# Patient Record
Sex: MALE | Race: WHITE | NOT HISPANIC OR LATINO | Employment: FULL TIME | URBAN - METROPOLITAN AREA
[De-identification: names, ages, dates, MRNs, and addresses within clinical notes are randomized per-mention and may not be internally consistent; named-entity substitution may affect disease eponyms.]

---

## 2024-01-25 ENCOUNTER — APPOINTMENT (OUTPATIENT)
Dept: CARDIOLOGY | Facility: HOSPITAL | Age: 59
End: 2024-01-25
Payer: COMMERCIAL

## 2024-01-25 ENCOUNTER — PHARMACY VISIT (OUTPATIENT)
Dept: PHARMACY | Facility: CLINIC | Age: 59
End: 2024-01-25
Payer: COMMERCIAL

## 2024-01-25 ENCOUNTER — HOSPITAL ENCOUNTER (OUTPATIENT)
Facility: HOSPITAL | Age: 59
Setting detail: OBSERVATION
Discharge: HOME | End: 2024-01-25
Attending: STUDENT IN AN ORGANIZED HEALTH CARE EDUCATION/TRAINING PROGRAM | Admitting: INTERNAL MEDICINE
Payer: COMMERCIAL

## 2024-01-25 ENCOUNTER — APPOINTMENT (OUTPATIENT)
Dept: RADIOLOGY | Facility: HOSPITAL | Age: 59
End: 2024-01-25
Payer: COMMERCIAL

## 2024-01-25 VITALS
BODY MASS INDEX: 23.03 KG/M2 | HEIGHT: 72 IN | RESPIRATION RATE: 24 BRPM | HEART RATE: 76 BPM | SYSTOLIC BLOOD PRESSURE: 143 MMHG | OXYGEN SATURATION: 99 % | DIASTOLIC BLOOD PRESSURE: 85 MMHG | WEIGHT: 170 LBS | TEMPERATURE: 98.2 F

## 2024-01-25 DIAGNOSIS — G45.9 TRANSIENT CEREBRAL ISCHEMIC ATTACK, UNSPECIFIED: ICD-10-CM

## 2024-01-25 DIAGNOSIS — R20.0 RIGHT FACIAL NUMBNESS: ICD-10-CM

## 2024-01-25 DIAGNOSIS — E78.00 HIGH CHOLESTEROL: ICD-10-CM

## 2024-01-25 DIAGNOSIS — G51.0 BELL PALSY: Primary | ICD-10-CM

## 2024-01-25 DIAGNOSIS — I61.9 CVA (CEREBROVASCULAR ACCIDENT DUE TO INTRACEREBRAL HEMORRHAGE) (MULTI): ICD-10-CM

## 2024-01-25 PROBLEM — H66.90 EAR INFECTION: Status: ACTIVE | Noted: 2024-01-25

## 2024-01-25 PROBLEM — H66.90 EAR INFECTION: Status: RESOLVED | Noted: 2024-01-25 | Resolved: 2024-01-25

## 2024-01-25 LAB
ALBUMIN SERPL-MCNC: 4.8 G/DL (ref 3.5–5)
ALP BLD-CCNC: 75 U/L (ref 35–125)
ALT SERPL-CCNC: 12 U/L (ref 5–40)
ANION GAP SERPL CALC-SCNC: 11 MMOL/L
AST SERPL-CCNC: 17 U/L (ref 5–40)
BASOPHILS # BLD AUTO: 0.03 X10*3/UL (ref 0–0.1)
BASOPHILS NFR BLD AUTO: 0.5 %
BILIRUB SERPL-MCNC: 1.6 MG/DL (ref 0.1–1.2)
BUN SERPL-MCNC: 13 MG/DL (ref 8–25)
CALCIUM SERPL-MCNC: 9.8 MG/DL (ref 8.5–10.4)
CHLORIDE SERPL-SCNC: 97 MMOL/L (ref 97–107)
CHOLEST SERPL-MCNC: 225 MG/DL (ref 133–200)
CHOLEST/HDLC SERPL: 4.5 {RATIO}
CO2 SERPL-SCNC: 29 MMOL/L (ref 24–31)
CREAT SERPL-MCNC: 0.9 MG/DL (ref 0.4–1.6)
EGFRCR SERPLBLD CKD-EPI 2021: >90 ML/MIN/1.73M*2
EOSINOPHIL # BLD AUTO: 0.02 X10*3/UL (ref 0–0.7)
EOSINOPHIL NFR BLD AUTO: 0.4 %
ERYTHROCYTE [DISTWIDTH] IN BLOOD BY AUTOMATED COUNT: 12.3 % (ref 11.5–14.5)
GLUCOSE SERPL-MCNC: 115 MG/DL (ref 65–99)
HCT VFR BLD AUTO: 42.8 % (ref 41–52)
HDLC SERPL-MCNC: 50 MG/DL
HGB BLD-MCNC: 14.5 G/DL (ref 13.5–17.5)
IMM GRANULOCYTES # BLD AUTO: 0.02 X10*3/UL (ref 0–0.7)
IMM GRANULOCYTES NFR BLD AUTO: 0.4 % (ref 0–0.9)
LDLC SERPL CALC-MCNC: 131 MG/DL (ref 65–130)
LYMPHOCYTES # BLD AUTO: 1.57 X10*3/UL (ref 1.2–4.8)
LYMPHOCYTES NFR BLD AUTO: 27.9 %
MCH RBC QN AUTO: 30 PG (ref 26–34)
MCHC RBC AUTO-ENTMCNC: 33.9 G/DL (ref 32–36)
MCV RBC AUTO: 88 FL (ref 80–100)
MONOCYTES # BLD AUTO: 0.39 X10*3/UL (ref 0.1–1)
MONOCYTES NFR BLD AUTO: 6.9 %
NEUTROPHILS # BLD AUTO: 3.6 X10*3/UL (ref 1.2–7.7)
NEUTROPHILS NFR BLD AUTO: 63.9 %
NRBC BLD-RTO: 0 /100 WBCS (ref 0–0)
PLATELET # BLD AUTO: 252 X10*3/UL (ref 150–450)
POTASSIUM SERPL-SCNC: 3.4 MMOL/L (ref 3.4–5.1)
PROT SERPL-MCNC: 7.8 G/DL (ref 5.9–7.9)
RBC # BLD AUTO: 4.84 X10*6/UL (ref 4.5–5.9)
SODIUM SERPL-SCNC: 137 MMOL/L (ref 133–145)
TRIGL SERPL-MCNC: 222 MG/DL (ref 40–150)
TROPONIN T SERPL-MCNC: 11 NG/L
TROPONIN T SERPL-MCNC: 11 NG/L
TROPONIN T SERPL-MCNC: 14 NG/L
WBC # BLD AUTO: 5.6 X10*3/UL (ref 4.4–11.3)

## 2024-01-25 PROCEDURE — 99222 1ST HOSP IP/OBS MODERATE 55: CPT | Performed by: PSYCHIATRY & NEUROLOGY

## 2024-01-25 PROCEDURE — 93880 EXTRACRANIAL BILAT STUDY: CPT

## 2024-01-25 PROCEDURE — 99285 EMERGENCY DEPT VISIT HI MDM: CPT | Mod: 27 | Performed by: STUDENT IN AN ORGANIZED HEALTH CARE EDUCATION/TRAINING PROGRAM

## 2024-01-25 PROCEDURE — 84484 ASSAY OF TROPONIN QUANT: CPT | Performed by: STUDENT IN AN ORGANIZED HEALTH CARE EDUCATION/TRAINING PROGRAM

## 2024-01-25 PROCEDURE — RXMED WILLOW AMBULATORY MEDICATION CHARGE

## 2024-01-25 PROCEDURE — 36415 COLL VENOUS BLD VENIPUNCTURE: CPT | Performed by: STUDENT IN AN ORGANIZED HEALTH CARE EDUCATION/TRAINING PROGRAM

## 2024-01-25 PROCEDURE — 70551 MRI BRAIN STEM W/O DYE: CPT

## 2024-01-25 PROCEDURE — 93880 EXTRACRANIAL BILAT STUDY: CPT | Performed by: INTERNAL MEDICINE

## 2024-01-25 PROCEDURE — 80053 COMPREHEN METABOLIC PANEL: CPT | Performed by: STUDENT IN AN ORGANIZED HEALTH CARE EDUCATION/TRAINING PROGRAM

## 2024-01-25 PROCEDURE — 36415 COLL VENOUS BLD VENIPUNCTURE: CPT | Performed by: INTERNAL MEDICINE

## 2024-01-25 PROCEDURE — 93010 ELECTROCARDIOGRAM REPORT: CPT | Performed by: INTERNAL MEDICINE

## 2024-01-25 PROCEDURE — 97166 OT EVAL MOD COMPLEX 45 MIN: CPT | Mod: GO

## 2024-01-25 PROCEDURE — 93005 ELECTROCARDIOGRAM TRACING: CPT

## 2024-01-25 PROCEDURE — G0378 HOSPITAL OBSERVATION PER HR: HCPCS

## 2024-01-25 PROCEDURE — 2500000001 HC RX 250 WO HCPCS SELF ADMINISTERED DRUGS (ALT 637 FOR MEDICARE OP): Performed by: INTERNAL MEDICINE

## 2024-01-25 PROCEDURE — 70450 CT HEAD/BRAIN W/O DYE: CPT

## 2024-01-25 PROCEDURE — 80061 LIPID PANEL: CPT | Performed by: INTERNAL MEDICINE

## 2024-01-25 PROCEDURE — 85025 COMPLETE CBC W/AUTO DIFF WBC: CPT | Performed by: STUDENT IN AN ORGANIZED HEALTH CARE EDUCATION/TRAINING PROGRAM

## 2024-01-25 PROCEDURE — 70544 MR ANGIOGRAPHY HEAD W/O DYE: CPT | Mod: 59

## 2024-01-25 RX ORDER — ONDANSETRON HYDROCHLORIDE 2 MG/ML
4 INJECTION, SOLUTION INTRAVENOUS EVERY 6 HOURS PRN
Status: DISCONTINUED | OUTPATIENT
Start: 2024-01-25 | End: 2024-01-25 | Stop reason: HOSPADM

## 2024-01-25 RX ORDER — GUAIFENESIN 100 MG/5ML
200 SOLUTION ORAL EVERY 4 HOURS PRN
Status: DISCONTINUED | OUTPATIENT
Start: 2024-01-25 | End: 2024-01-25 | Stop reason: HOSPADM

## 2024-01-25 RX ORDER — ACETAMINOPHEN 325 MG/1
650 TABLET ORAL EVERY 6 HOURS PRN
Status: DISCONTINUED | OUTPATIENT
Start: 2024-01-25 | End: 2024-01-25 | Stop reason: HOSPADM

## 2024-01-25 RX ORDER — ATORVASTATIN CALCIUM 40 MG/1
40 TABLET, FILM COATED ORAL NIGHTLY
Status: DISCONTINUED | OUTPATIENT
Start: 2024-01-25 | End: 2024-01-25 | Stop reason: HOSPADM

## 2024-01-25 RX ORDER — ACETAMINOPHEN 500 MG
5 TABLET ORAL NIGHTLY PRN
Status: DISCONTINUED | OUTPATIENT
Start: 2024-01-25 | End: 2024-01-25 | Stop reason: HOSPADM

## 2024-01-25 RX ORDER — ATORVASTATIN CALCIUM 40 MG/1
40 TABLET, FILM COATED ORAL NIGHTLY
Qty: 30 TABLET | Refills: 0 | Status: SHIPPED | OUTPATIENT
Start: 2024-01-25 | End: 2024-02-16 | Stop reason: SDUPTHER

## 2024-01-25 RX ORDER — NAPROXEN SODIUM 220 MG/1
81 TABLET, FILM COATED ORAL DAILY
Status: DISCONTINUED | OUTPATIENT
Start: 2024-01-25 | End: 2024-01-25 | Stop reason: HOSPADM

## 2024-01-25 RX ORDER — ONDANSETRON 4 MG/1
4 TABLET, FILM COATED ORAL 4 TIMES DAILY PRN
Status: DISCONTINUED | OUTPATIENT
Start: 2024-01-25 | End: 2024-01-25 | Stop reason: HOSPADM

## 2024-01-25 RX ORDER — ALUMINUM HYDROXIDE, MAGNESIUM HYDROXIDE, AND SIMETHICONE 1200; 120; 1200 MG/30ML; MG/30ML; MG/30ML
30 SUSPENSION ORAL 4 TIMES DAILY PRN
Status: DISCONTINUED | OUTPATIENT
Start: 2024-01-25 | End: 2024-01-25 | Stop reason: HOSPADM

## 2024-01-25 RX ORDER — ENOXAPARIN SODIUM 100 MG/ML
40 INJECTION SUBCUTANEOUS NIGHTLY
Status: DISCONTINUED | OUTPATIENT
Start: 2024-01-25 | End: 2024-01-25 | Stop reason: HOSPADM

## 2024-01-25 RX ADMIN — ASPIRIN 81 MG 81 MG: 81 TABLET ORAL at 11:13

## 2024-01-25 SDOH — SOCIAL STABILITY: SOCIAL NETWORK: HOW OFTEN DO YOU ATTENT MEETINGS OF THE CLUB OR ORGANIZATION YOU BELONG TO?: NEVER

## 2024-01-25 SDOH — HEALTH STABILITY: MENTAL HEALTH
STRESS IS WHEN SOMEONE FEELS TENSE, NERVOUS, ANXIOUS, OR CAN'T SLEEP AT NIGHT BECAUSE THEIR MIND IS TROUBLED. HOW STRESSED ARE YOU?: NOT AT ALL

## 2024-01-25 SDOH — SOCIAL STABILITY: SOCIAL NETWORK: HOW OFTEN DO YOU GET TOGETHER WITH FRIENDS OR RELATIVES?: NEVER

## 2024-01-25 SDOH — SOCIAL STABILITY: SOCIAL INSECURITY: WITHIN THE LAST YEAR, HAVE YOU BEEN HUMILIATED OR EMOTIONALLY ABUSED IN OTHER WAYS BY YOUR PARTNER OR EX-PARTNER?: NO

## 2024-01-25 SDOH — SOCIAL STABILITY: SOCIAL NETWORK: ARE YOU MARRIED, WIDOWED, DIVORCED, SEPARATED, NEVER MARRIED, OR LIVING WITH A PARTNER?: MARRIED

## 2024-01-25 SDOH — ECONOMIC STABILITY: HOUSING INSECURITY
IN THE LAST 12 MONTHS, WAS THERE A TIME WHEN YOU DID NOT HAVE A STEADY PLACE TO SLEEP OR SLEPT IN A SHELTER (INCLUDING NOW)?: NO

## 2024-01-25 SDOH — SOCIAL STABILITY: SOCIAL INSECURITY: WITHIN THE LAST YEAR, HAVE YOU BEEN AFRAID OF YOUR PARTNER OR EX-PARTNER?: NO

## 2024-01-25 SDOH — SOCIAL STABILITY: SOCIAL INSECURITY: DO YOU FEEL ANYONE HAS EXPLOITED OR TAKEN ADVANTAGE OF YOU FINANCIALLY OR OF YOUR PERSONAL PROPERTY?: NO

## 2024-01-25 SDOH — ECONOMIC STABILITY: FOOD INSECURITY: WITHIN THE PAST 12 MONTHS, THE FOOD YOU BOUGHT JUST DIDN'T LAST AND YOU DIDN'T HAVE MONEY TO GET MORE.: NEVER TRUE

## 2024-01-25 SDOH — SOCIAL STABILITY: SOCIAL INSECURITY: HAS ANYONE EVER THREATENED TO HURT YOUR FAMILY OR YOUR PETS?: NO

## 2024-01-25 SDOH — SOCIAL STABILITY: SOCIAL INSECURITY: ARE YOU OR HAVE YOU BEEN THREATENED OR ABUSED PHYSICALLY, EMOTIONALLY, OR SEXUALLY BY ANYONE?: NO

## 2024-01-25 SDOH — ECONOMIC STABILITY: INCOME INSECURITY: IN THE PAST 12 MONTHS, HAS THE ELECTRIC, GAS, OIL, OR WATER COMPANY THREATENED TO SHUT OFF SERVICE IN YOUR HOME?: NO

## 2024-01-25 SDOH — ECONOMIC STABILITY: FOOD INSECURITY: WITHIN THE PAST 12 MONTHS, YOU WORRIED THAT YOUR FOOD WOULD RUN OUT BEFORE YOU GOT MONEY TO BUY MORE.: NEVER TRUE

## 2024-01-25 SDOH — ECONOMIC STABILITY: INCOME INSECURITY: IN THE LAST 12 MONTHS, WAS THERE A TIME WHEN YOU WERE NOT ABLE TO PAY THE MORTGAGE OR RENT ON TIME?: NO

## 2024-01-25 SDOH — HEALTH STABILITY: MENTAL HEALTH: HOW MANY STANDARD DRINKS CONTAINING ALCOHOL DO YOU HAVE ON A TYPICAL DAY?: 1 OR 2

## 2024-01-25 SDOH — SOCIAL STABILITY: SOCIAL NETWORK
DO YOU BELONG TO ANY CLUBS OR ORGANIZATIONS SUCH AS CHURCH GROUPS UNIONS, FRATERNAL OR ATHLETIC GROUPS, OR SCHOOL GROUPS?: NO

## 2024-01-25 SDOH — HEALTH STABILITY: MENTAL HEALTH: HOW OFTEN DO YOU HAVE 6 OR MORE DRINKS ON ONE OCCASION?: NEVER

## 2024-01-25 SDOH — HEALTH STABILITY: MENTAL HEALTH: HOW OFTEN DO YOU HAVE A DRINK CONTAINING ALCOHOL?: 2-4 TIMES A MONTH

## 2024-01-25 SDOH — ECONOMIC STABILITY: INCOME INSECURITY: HOW HARD IS IT FOR YOU TO PAY FOR THE VERY BASICS LIKE FOOD, HOUSING, MEDICAL CARE, AND HEATING?: NOT HARD AT ALL

## 2024-01-25 SDOH — SOCIAL STABILITY: SOCIAL INSECURITY
WITHIN THE LAST YEAR, HAVE TO BEEN RAPED OR FORCED TO HAVE ANY KIND OF SEXUAL ACTIVITY BY YOUR PARTNER OR EX-PARTNER?: NO

## 2024-01-25 SDOH — SOCIAL STABILITY: SOCIAL INSECURITY: DOES ANYONE TRY TO KEEP YOU FROM HAVING/CONTACTING OTHER FRIENDS OR DOING THINGS OUTSIDE YOUR HOME?: NO

## 2024-01-25 SDOH — SOCIAL STABILITY: SOCIAL INSECURITY
WITHIN THE LAST YEAR, HAVE YOU BEEN KICKED, HIT, SLAPPED, OR OTHERWISE PHYSICALLY HURT BY YOUR PARTNER OR EX-PARTNER?: NO

## 2024-01-25 SDOH — HEALTH STABILITY: PHYSICAL HEALTH: ON AVERAGE, HOW MANY DAYS PER WEEK DO YOU ENGAGE IN MODERATE TO STRENUOUS EXERCISE (LIKE A BRISK WALK)?: 0 DAYS

## 2024-01-25 SDOH — ECONOMIC STABILITY: TRANSPORTATION INSECURITY
IN THE PAST 12 MONTHS, HAS THE LACK OF TRANSPORTATION KEPT YOU FROM MEDICAL APPOINTMENTS OR FROM GETTING MEDICATIONS?: NO

## 2024-01-25 SDOH — SOCIAL STABILITY: SOCIAL NETWORK: HOW OFTEN DO YOU ATTEND CHURCH OR RELIGIOUS SERVICES?: NEVER

## 2024-01-25 SDOH — ECONOMIC STABILITY: TRANSPORTATION INSECURITY
IN THE PAST 12 MONTHS, HAS LACK OF TRANSPORTATION KEPT YOU FROM MEETINGS, WORK, OR FROM GETTING THINGS NEEDED FOR DAILY LIVING?: NO

## 2024-01-25 SDOH — ECONOMIC STABILITY: HOUSING INSECURITY: IN THE LAST 12 MONTHS, HOW MANY PLACES HAVE YOU LIVED?: 1

## 2024-01-25 SDOH — SOCIAL STABILITY: SOCIAL INSECURITY: DO YOU FEEL UNSAFE GOING BACK TO THE PLACE WHERE YOU ARE LIVING?: NO

## 2024-01-25 SDOH — SOCIAL STABILITY: SOCIAL INSECURITY: ARE THERE ANY APPARENT SIGNS OF INJURIES/BEHAVIORS THAT COULD BE RELATED TO ABUSE/NEGLECT?: NO

## 2024-01-25 SDOH — SOCIAL STABILITY: SOCIAL NETWORK
IN A TYPICAL WEEK, HOW MANY TIMES DO YOU TALK ON THE PHONE WITH FAMILY, FRIENDS, OR NEIGHBORS?: MORE THAN THREE TIMES A WEEK

## 2024-01-25 SDOH — HEALTH STABILITY: PHYSICAL HEALTH: ON AVERAGE, HOW MANY MINUTES DO YOU ENGAGE IN EXERCISE AT THIS LEVEL?: 0 MIN

## 2024-01-25 ASSESSMENT — LIFESTYLE VARIABLES
EVER HAD A DRINK FIRST THING IN THE MORNING TO STEADY YOUR NERVES TO GET RID OF A HANGOVER: NO
AUDIT-C TOTAL SCORE: 2
EVER FELT BAD OR GUILTY ABOUT YOUR DRINKING: NO
SUBSTANCE_ABUSE_PAST_12_MONTHS: NO
SKIP TO QUESTIONS 9-10: 1
REASON UNABLE TO ASSESS: NO
HAVE PEOPLE ANNOYED YOU BY CRITICIZING YOUR DRINKING: NO
HAVE YOU EVER FELT YOU SHOULD CUT DOWN ON YOUR DRINKING: NO
PRESCIPTION_ABUSE_PAST_12_MONTHS: NO

## 2024-01-25 ASSESSMENT — ENCOUNTER SYMPTOMS
HALLUCINATIONS: 0
VOICE CHANGE: 0
DECREASED CONCENTRATION: 0
SEIZURES: 0
HEADACHES: 0
WEAKNESS: 0
CONFUSION: 0
SPEECH DIFFICULTY: 0
EYES NEGATIVE: 1
TREMORS: 0
NERVOUS/ANXIOUS: 0
NUMBNESS: 1
SINUS PRESSURE: 0
CONSTITUTIONAL NEGATIVE: 1
ENDOCRINE NEGATIVE: 1
FACIAL ASYMMETRY: 1
NECK PAIN: 1
LIGHT-HEADEDNESS: 0
DIZZINESS: 0
SORE THROAT: 0
PSYCHIATRIC NEGATIVE: 1
HEADACHES: 1

## 2024-01-25 ASSESSMENT — ACTIVITIES OF DAILY LIVING (ADL)
PATIENT'S MEMORY ADEQUATE TO SAFELY COMPLETE DAILY ACTIVITIES?: YES
BATHING: INDEPENDENT
WALKS IN HOME: INDEPENDENT
HEARING - RIGHT EAR: FUNCTIONAL
JUDGMENT_ADEQUATE_SAFELY_COMPLETE_DAILY_ACTIVITIES: YES
DRESSING YOURSELF: INDEPENDENT
HEARING - LEFT EAR: FUNCTIONAL
GROOMING: INDEPENDENT
BATHING_ASSISTANCE: INDEPENDENT
FEEDING YOURSELF: INDEPENDENT
TOILETING: INDEPENDENT
LACK_OF_TRANSPORTATION: NO

## 2024-01-25 ASSESSMENT — COGNITIVE AND FUNCTIONAL STATUS - GENERAL: DAILY ACTIVITIY SCORE: 24

## 2024-01-25 ASSESSMENT — PAIN SCALES - GENERAL
PAINLEVEL_OUTOF10: 0 - NO PAIN
PAINLEVEL_OUTOF10: 0 - NO PAIN

## 2024-01-25 ASSESSMENT — PAIN - FUNCTIONAL ASSESSMENT
PAIN_FUNCTIONAL_ASSESSMENT: 0-10
PAIN_FUNCTIONAL_ASSESSMENT: 0-10

## 2024-01-25 ASSESSMENT — COLUMBIA-SUICIDE SEVERITY RATING SCALE - C-SSRS
1. IN THE PAST MONTH, HAVE YOU WISHED YOU WERE DEAD OR WISHED YOU COULD GO TO SLEEP AND NOT WAKE UP?: NO
6. HAVE YOU EVER DONE ANYTHING, STARTED TO DO ANYTHING, OR PREPARED TO DO ANYTHING TO END YOUR LIFE?: NO
2. HAVE YOU ACTUALLY HAD ANY THOUGHTS OF KILLING YOURSELF?: NO

## 2024-01-25 ASSESSMENT — VISUAL ACUITY: VA_NORMAL: 1

## 2024-01-25 NOTE — ED NOTES
I was speaking to the ED regarding - sensory to the face; common strange thing. Functional neurological disorder; neutral opposite raises his eye brows; partial facial Blas-Medeiros. Facial common - no relation fifth cranial nerve. Numbness, facial weakness. MRI brain for brainstem stroke rule out. I cannot tell from his exam - left cheek weakness on the blow against force-closed lips, his concern over right cheek numbness, and the lack of forehead involvement for his right facial weakness. We usually see something of this on FND facial weakness behaviors, but he still could have had a brainstem stroke - risk factors not reviewed. I advised admission for MRI brain without MRA brain, which cannot be done without admitting him to the Hospitalist service, even though he may stay in the ED.     I personally spent 11 minutes today (exclusive of procedures) providing care for this patient, including 6 minutes on the phone, including chart documentation and other services (review of his past medical records, imaging and other diagnostic results, and coordination of care as specified in the encounter.     Randall Becerra MD  [facility] Neurohospitalist

## 2024-01-25 NOTE — CARE PLAN
Pt does not have a POA or Living Will    ADOD: 1 day    Pt lives at home with his wife and dtr in a 2 story home without a basement and 3 steps to enter the home  Pt works full time; he does not have home 02, no cpap or bipap. He does not use a nebulizer; he is not a diabetic  He denies hx of depression or anxiety.  He does not have a PCP; his pharmacy is [facility] [place]  He wears Readers and no hearing aids  Pt is here to r/o CVA  Pt denies weakness in his upper and lower extremities  No anticipated discharge needs    DISCHARGE PLAN: HOME WITH FAMILY

## 2024-01-25 NOTE — PROGRESS NOTES
01/25/24 1127   [facility] Disability Status   Are you deaf or do you have serious difficulty hearing? N   Are you blind or do you have serious difficulty seeing, even when wearing glasses? N   Because of a physical, mental, or emotional condition, do you have serious difficulty concentrating, remembering, or making decisions? (5 years old or older) N   Do you have serious difficulty walking or climbing stairs? N   Do you have serious difficulty dressing or bathing? N   Because of a physical, mental, or emotional condition, do you have serious difficulty doing errands alone such as visiting the doctor? N

## 2024-01-25 NOTE — ED TRIAGE NOTES
Patient with facial numbness for about a day. Started augmentin and medrol pack on Monday for ear infection. Now with L facial droop

## 2024-01-25 NOTE — CONSULTS
[facility]  Inpatient Neurology Consultation    Date of Service: 1/25/2024, Hospital Day: 1     Inpatient consult to Neurology  Consult performed by: Randall Becerra MD  Consult ordered by: Eliz Zhang MD  Reason for consult: facial weakness  Assessment/Recommendations:     Impressions: Right Bell's palsy - likely HSV related and not VZV. No trigger identified in this otherwise healthy man. At risk for stroke and in the slight possibility of stroke we had to rule it out. His neurological examination is completely normal except for cranial nerve VII and only on the right. He has a Bell's phenomenon but is at risk of developing a corneal drying/ulcer OD.     Recommendations/Plans: Establish a PCP with Dr. Monge/ADY Denis-CNP to lower stroke lists. TIA/stroke 911 precautions given. He is neurologically stable to go home tonight with Lacri-Lube nightly.  Steroids are optional at this point and antivirals are not helpful.  He was warned about the facial disfigurement and given the example of Chetan Ambrosio to ease him into the unfamiliarity and the long-term possibilities.  He was also warned about crocodile tears.  No neurological outpatient follow-up is necessary.      Inpatient consult to Neurology  Consult performed by: Randall Becerra MD  Consult ordered by: Mendez Alcantara PA-C      History Of Present Illness: Sohan is a 59-year-old right-handed man former smoker past medical history of hypertension and hyperlipidemia (untreated) who has a rather unusual story as follows: He just did not feel well last week whelping pain behind his right ear later moved into his ear and sought help from urgent care on 1/22/2024.  They said they could see something happening in that ear and gave him antibiotics and steroid tapering he did not start the steroids until Tuesday.  He says that he had an asymmetry of his face for a few days (very vague historian, seem anxious and  overwhelmed) but it was the numbness in the right side of his face that sent him to the [facility] emergency room today from work.  Upon arrival blood pressure was elevated (159/97 mmHg).  CT head without IV contrast (2024, 1031) was normal.  The ED course neurological course is documented in my ED note and because of the possibility that he had a brainstem stroke instead he was kept for neuroimaging completed this afternoon.  MRI brain (2024) was unremarkable.  MRA brain (24) was also normal. He doesn't feel any different now than this morning - feel odd in his face, pointing to the left side of his face as different than his right. No pain. OD has decreased vision from childhood due to amblyopia but does not feel like a foreign object is in there no pain or redness. . Otological review of systems also normal.     Past Medical History:   Diagnosis Date    High cholesterol     Right-sided Bell's palsy 2024     Surgical History  History reviewed. No pertinent surgical history.      Social History     Tobacco Use    Smoking status: Former     Packs/day: 0.50     Years: 15.00     Additional pack years: 0.00     Total pack years: 7.50     Types: Cigarettes     Quit date:      Years since quittin.0    Smokeless tobacco: Never   Substance Use Topics    Alcohol use: Yes     Comment: once a week     Allergies  Patient has no known allergies.  No medications prior to admission.     Review of Systems   HENT:  Positive for ear pain. Negative for hearing loss and tinnitus.    Eyes:  Positive for visual disturbance.   Musculoskeletal:  Positive for neck pain.   Neurological:  Positive for facial asymmetry and headaches. Negative for dizziness, speech difficulty, weakness and light-headedness.   Psychiatric/Behavioral:  Negative for behavioral problems, confusion and decreased concentration. The patient is not nervous/anxious.      Neurological Exam  Mental Status  Awake and alert. Oriented to person,  place, time and situation. Recent and remote memory are intact. Speech is normal. Language is fluent with no aphasia. Attention and concentration are normal.    Cranial Nerves  CN II: Visual acuity is normal. Visual fields full to confrontation.  CN III, IV, VI: Extraocular movements intact bilaterally. Normal lids and orbits bilaterally. Pupils equal round and reactive to light bilaterally.  CN V: Facial sensation is normal.  Right: Corneal reflex (cotton swab) provokes a Bell's phenomenon in V2 on the right. .  CN VII:  Right: There is peripheral facial weakness. Animates with slight asymmetry, right eye doesn't blink when the left does. He has weakness at the outermost upper facial muscles. .  CN VIII: Hearing is normal.  CN IX, X: Palate elevates symmetrically. Normal gag reflex.  CN XI: Shoulder shrug strength is normal.  CN XII: Tongue midline without atrophy or fasciculations.    Motor  Normal muscle bulk throughout. No fasciculations present. Normal muscle tone. No abnormal involuntary movements. Strength is 5/5 throughout all four extremities.    Sensory  Light touch is normal in upper and lower extremities.     Reflexes                                            Right                      Left  Brachioradialis                    1+                         1+  Biceps                                 1+                         1+  Triceps                                1+                         1+  Patellar                                2+                         2+  Achilles                                1+                         1+  Right Plantar: downgoing  Left Plantar: downgoing    Right pathological reflexes: Tromner absent.  Left pathological reflexes: Tromner absent.   Right palmomental absent. Left palmomental absent.    Coordination  Right: Finger-to-nose normal. Rapid alternating movement normal. Heel-to-shin normal.Left: Finger-to-nose normal. Rapid alternating movement normal. Heel-to-shin  normal.    Gait  Casual gait is normal including stance, stride, and arm swing. Normal tandem gait. Romberg is absent.    Physical Exam  Constitutional:       General: He is awake. He is not in acute distress.     Appearance: Normal appearance. He is normal weight. He is not ill-appearing.   Eyes:      General: Lids are normal.      Extraocular Movements: Extraocular movements intact.      Pupils: Pupils are equal, round, and reactive to light.   Musculoskeletal:      Cervical back: No tenderness.   Neurological:      Mental Status: He is alert.      Motor: Motor strength is normal.     Coordination: Romberg sign negative.      Deep Tendon Reflexes:      Reflex Scores:       Tricep reflexes are 1+ on the right side and 1+ on the left side.       Bicep reflexes are 1+ on the right side and 1+ on the left side.       Brachioradialis reflexes are 1+ on the right side and 1+ on the left side.       Patellar reflexes are 2+ on the right side and 2+ on the left side.       Achilles reflexes are 1+ on the right side and 1+ on the left side.  Psychiatric:         Mood and Affect: Mood normal.         Speech: Speech normal.         Behavior: Behavior normal.         Thought Content: Thought content normal.         Judgment: Judgment normal.       Last Recorded Vitals  Blood pressure 143/85, pulse 76, temperature 36.8 °C (98.2 °F), temperature source Oral, resp. rate 24, height 1.829 m (6'), weight 77.1 kg (170 lb), SpO2 99 %.    Medical Decision Making:  Vascular US Carotid Artery Duplex Bilateral (1/25/2024): Less than 50% stenosis of the bilateral internal carotid arteries.      MR angio head wo IV contrast (1/25/2024): Within normal limits. I personally reviewed these images and concur with the radiological interpretation.      MR brain wo IV contrast (1/25/2024 2:29 pm): Within normal limits. I personally reviewed these images and concur with the radiological interpretation.      CT head wo IV contrast (1/25/2024):  Unremarkably normal. I personally reviewed these images and concur with the radiological interpretation.      I personally spent 62 minutes (pre-visit review: 10:35 AM to 10:46 AM, in-person: 4:45 PM to 5:10 PM; and 5:22 PM to 5:48 PM in chart completion) today (exclusive of procedures) providing care for this patient, including preparation, verbal huddle with nursing, my direct face-to-face time with the patient including education and counseling regarding his neurological condition and management plan, including chart documentation and other services, including review of his past medical records, imaging and other diagnostic results, and coordination of care as specified in the encounter.     Randall Becerra MD  [facility] Neurohospitalist  (contact by secure message preferred)

## 2024-01-25 NOTE — DISCHARGE SUMMARY
Discharge Diagnosis  CVA (cerebrovascular accident due to intracerebral hemorrhage) (CMS/HCC)  Problem   High Cholesterol   Elliott Palsy   Cva (Cerebrovascular Accident Due to Intracerebral Hemorrhage) (Cms/Hcc) (Resolved)   Ear Infection (Resolved)         Issues Requiring Follow-Up  Follow up with your PCP No Assigned PCP Generic Provider, MD, within 1 week     Imaging results   Vascular US Carotid Artery Duplex Bilateral    Result Date: 1/25/2024           [address] [address]            Phone [phone]  Vascular Lab Report  [facility] US CAROTID ARTERY DUPLEX BILATERAL Patient Name:      TYRESE Leslie Physician:  [id number] Maggi Mcghee MD, RPVI Study Date:        1/25/2024            Ordering Provider:  [id number] BRADLEY LUCIO MRN/PID:           [id number]             Fellow: Accession#:        [id number]         Technologist:       Isabela Dos Santos RVJOSE F Date of Birth/Age: 1965 / 59 years Technologist 2: Gender:            M                    Encounter#:         [id number] Admission Status:  Emergency            Location Performed: [facility]  Diagnosis/ICD: Transient cerebral ischemic attack, unspecified-G45.9 Indication:    Transcient ischemic attack CPT Codes:     55092 Cerebrovascular Carotid Duplex scan complete  CONCLUSIONS:  Right Carotid: Findings are consistent with less than 50% stenosis of the right proximal internal carotid artery. Right external carotid artery appears patent with no evidence of stenosis. The right vertebral artery is patent with antegrade flow. No evidence of hemodynamically significant stenosis in the right subclavian artery. Left Carotid: Findings are consistent with less than 50% stenosis of the left proximal internal carotid artery. Left external carotid artery appears patent with no evidence of  stenosis. The left vertebral artery is patent with antegrade flow. No evidence of hemodynamically significant stenosis in the left subclavian artery.  Imaging & Doppler Findings:  Right Plaque Morph: No plaque identified in the right carotid artery. Left Plaque Morph: No plaque identified in the left carotid artery.   Right                        Left   PSV      EDV                PSV      EDV 96 cm/s            CCA P    102 cm/s 71 cm/s            CCA D    65 cm/s 68 cm/s  19 cm/s   ICA P    38 cm/s  11 cm/s 68 cm/s  32 cm/s   ICA M    74 cm/s  28 cm/s 92 cm/s  33 cm/s   ICA D    110 cm/s 41 cm/s 67 cm/s             ECA     39 cm/s 40 cm/s  13 cm/s Vertebral  37 cm/s  13 cm/s 146 cm/s         Subclavian 132 cm/s                Right Left ICA/CCA Ratio  1.0  0.6   [id number] Maggi Mcghee MD, RPVI Electronically signed by [id number] Maggi Mcghee MD, RPVI on 1/25/2024 at 4:04:36 PM  ** Final **     MR angio head wo IV contrast    Result Date: 1/25/2024  Interpreted By:  Danielito Lara, STUDY: MR ANGIO HEAD WO IV CONTRAST;  1/25/2024 2:30 pm   INDICATION: Concern for CVA, right facial weakness and facial droop   COMPARISON: None.   ACCESSION NUMBER(S): [id number]   ORDERING CLINICIAN: BRADLEY LUCIO   TECHNIQUE: Time-of-flight MRA of the head was performed. The images were reviewed as source images and maximum intensity projections.   FINDINGS: MRA is within normal limits.   Anterior circulation:    There is expected flow signal in bilateral intracranial internal carotid arteries, bilateral carotid terminals, bilateral proximal anterior and middle cerebral arteries.   Posterior circulation:    Bilateral intracranial vertebral arteries, vertebrobasilar junction, basilar artery and proximal posterior cerebral arteries demonstrate expected flow signal. There is fetal origin of the left posterior cerebral artery. No evidence for a right posterior communicating artery.       MR angiogram is within normal limits.   MACRO:  None   Signed by: Danielito Lara 1/25/2024 3:44 PM Dictation workstation:   [id number]    MR brain wo IV contrast    Result Date: 1/25/2024  Interpreted By:  Danielito Lara, STUDY: MR BRAIN WO IV CONTRAST; 1/25/2024 2:29 pm   INDICATION: Right facial numbness and drooping, concern for CVA   COMPARISON: CT brain dated 01/25/2024   ACCESSION NUMBER(S): [id number]   ORDERING CLINICIAN: BRADLEY LUCIO   TECHNIQUE: Multiple, multiplanar sequences of the brain were acquired.   FINDINGS: No focal mass effect or midline shift is identified. The ventricles and sulci are symmetric and appropriate for the patient's age.   The gray-white matter differentiation is preserved. No restricted diffusion is seen.   Sagittal T1 images show grossly normal appearance of sella and midline structures.   No acute intracranial hemorrhage is seen. No intra-axial or extra-axial fluid collection is seen.   The visualized paranasal sinuses and mastoid air cells are clear.       No acute intracranial findings. MRI brain is within normal limits.   Signed by: Danielito Lara 1/25/2024 3:42 PM Dictation workstation:   [id number]    CT head wo IV contrast    Result Date: 1/25/2024  Interpreted By:  Danielito Lara, STUDY: ASAD FRENCH; 1/25/2024 10:20 am   INDICATION: Signs/Symptoms:possible stroke;   COMPARISON: 01/11/2017   ACCESSION NUMBER(S): [id number]   ORDERING CLINICIAN: ASAD FRENCH   TECHNIQUE: Contiguous axial images were acquired from the vertex through the posterior fossa without IV contrast. All CT examinations are performed with 1 or more of the following dose reduction techniques: Automated exposure control, adjustment of mA and/or kv according to patient's size, or use of iterative reconstruction techniques.   FINDINGS: No focal mass effect or midline shift is identified. The ventricles and sulci are symmetric and appropriate for the patient's age.   The gray white matter differentiation is preserved.    No acute intracranial hemorrhage is seen. No intra-axial or extra-axial fluid collection is seen.   The visualized paranasal sinuses and mastoid air cells are clear.       No CT evidence for acute intracranial pathology.   Signed by: Danielito Lara 1/25/2024 10:31 AM Dictation workstation:   [id number]      Hospital Course  This is 59 years old male with no significant past medical history except high cholesterol.  Patient presented to [facility] with complaint of right facial numbness and drooping x 2 days .  Blood pressure was slightly high has to follow-up with his primary doctor as outpatient.  Seen  by neurology his right facial drooping is related to Bell's palsy.  Has to follow-up with neurology as outpatient.  Patient is hemodynamically stable.     Physical exam  Physical Exam  General: alert, no diaphoresis   HENT: Right-sided facial drooping . mucous membranes moist, external ears normal, no rhinorrhea   Eyes: no icterus or injection, no discharge   Lungs: CTA BL   Heart: RRR, no murmurs, no LE edema BL   GI: abdomen soft, nontender, nondistended, BS present   MSK: no joint effusion or deformity   Skin: no rashes, erythema, or ecchymosis   Neuro: grossly normal cognition, motor strength, sensation     Relevant Results    Lab Results   Component Value Date    WBC 5.6 01/25/2024    HGB 14.5 01/25/2024    HCT 42.8 01/25/2024    MCV 88 01/25/2024     01/25/2024     Lab Results   Component Value Date    GLUCOSE 115 (H) 01/25/2024    CALCIUM 9.8 01/25/2024     01/25/2024    K 3.4 01/25/2024    CO2 29 01/25/2024    CL 97 01/25/2024    BUN 13 01/25/2024    CREATININE 0.90 01/25/2024        Medication List      START taking these medications     atorvastatin 40 mg tablet; Commonly known as: Lipitor; Take 1 tablet (40   mg) by mouth once daily at bedtime.   white petrolatum-mineral oiL 94-3 % ophthalmic ointment; Commonly known   as: Tears Naturale PM; Apply 1 Application to both eyes once daily  at   bedtime.       Outpatient Follow-Up  No future appointments.      Time overall spent on discharge summary 35 minutes    ADY Garcia-CNP

## 2024-01-25 NOTE — H&P
HPI  HPI  Sohan Valdez is a 59 y.o. male on day 0 of admission presenting with CVA (cerebrovascular accident due to intracerebral hemorrhage) (CMS/[facility]).  This is 59 years old male with no significant past medical history except high cholesterol.  Patient presented to [facility] ER with complaint of right facial numbness and drooping x 2 days.  Patient said that he had right ear infection and he was recently on Augmentin and steroid.  Yesterday morning stopped Augmentin and last night stopped steroid due to his right facial drooping.  Patient admitted that he did not see a primary doctor for almost 5 years.  He also was diagnosis with high cholesterol and started since starting did not like side effects and stopped it.  Patient used to smoke half pack it for almost 10 to 15 years quit in 1999.  He said never been diagnosis with high blood pressure, TIA or diabetes.  Patient complains of right facial drooping with numbness no tingling.  Admits that he has a blurred vision in his right eye which is chronic.  He denies any weakness in his all his extremities able to move them without any limitation.  Denies any dizziness or light headache.  No nausea vomiting or abdominal pain.  No diarrhea or constipation.  No chest pain or shortness of breath or room air.    Code  status full code  Past Medical History:   Diagnosis Date    CVA (cerebrovascular accident due to intracerebral hemorrhage) (CMS/[facility]) 01/25/2024    High cholesterol        History reviewed. No pertinent surgical history.    Social History     Socioeconomic History    Marital status:      Spouse name: Not on file    Number of children: Not on file    Years of education: Not on file    Highest education level: Not on file   Occupational History    Not on file   Tobacco Use    Smoking status: Former     Packs/day: 0.50     Years: 15.00     Additional pack years: 0.00     Total pack years: 7.50     Types: Cigarettes     Quit date: 1999     Years since  quittin.0    Smokeless tobacco: Never   Substance and Sexual Activity    Alcohol use: Yes     Comment: once a week    Drug use: Not on file    Sexual activity: Yes   Other Topics Concern    Not on file   Social History Narrative    Not on file     Social Determinants of Health     Financial Resource Strain: Low Risk  (2024)    Overall Financial Resource Strain (CARDIA)     Difficulty of Paying Living Expenses: Not hard at all   Food Insecurity: No Food Insecurity (2024)    Hunger Vital Sign     Worried About Running Out of Food in the Last Year: Never true     Ran Out of Food in the Last Year: Never true   Transportation Needs: No Transportation Needs (2024)    PRAPARE - Transportation     Lack of Transportation (Medical): No     Lack of Transportation (Non-Medical): No   Physical Activity: Inactive (2024)    Exercise Vital Sign     Days of Exercise per Week: 0 days     Minutes of Exercise per Session: 0 min   Stress: No Stress Concern Present (2024)    [employer] - Occupational Stress Questionnaire     Feeling of Stress : Not at all   Social Connections: Moderately Isolated (2024)    Social Connection and Isolation Panel [NHANES]     Frequency of Communication with Friends and Family: More than three times a week     Frequency of Social Gatherings with Friends and Family: Never     Attends Alevism Services: Never     Active Member of Clubs or Organizations: No     Attends Club or Organization Meetings: Never     Marital Status:    Intimate Partner Violence: Not At Risk (2024)    Humiliation, Afraid, Rape, and Kick questionnaire     Fear of Current or Ex-Partner: No     Emotionally Abused: No     Physically Abused: No     Sexually Abused: No   Housing Stability: Low Risk  (2024)    Housing Stability Vital Sign     Unable to Pay for Housing in the Last Year: No     Number of Places Lived in the Last Year: 1     Unstable Housing in the  Last Year: No       Family History   Problem Relation Name Age of Onset    Other (htn) Mother      Prostate cancer Father  83       Allergies  Patient has no known allergies.    Review of systems  Review of Systems   Constitutional: Negative.    HENT:  Positive for ear pain. Negative for ear discharge, hearing loss, mouth sores, sinus pressure, sore throat and voice change.    Eyes: Negative.         Right eye blurred vision which is chronic   Endocrine: Negative.    Neurological:  Positive for facial asymmetry and numbness. Negative for dizziness, tremors, seizures, speech difficulty, weakness, light-headedness and headaches.   Psychiatric/Behavioral: Negative.  Negative for behavioral problems, confusion, decreased concentration and hallucinations.        Physical exam  Physical Exam  Vitals and nursing note reviewed.   Constitutional:       Appearance: Normal appearance. He is normal weight.   HENT:      Head: Normocephalic and atraumatic.      Right Ear: Tympanic membrane, ear canal and external ear normal. There is impacted cerumen.      Left Ear: Tympanic membrane, ear canal and external ear normal. There is impacted cerumen.      Nose: Nose normal. No congestion.      Mouth/Throat:      Mouth: Mucous membranes are moist.   Eyes:      Pupils: Pupils are equal, round, and reactive to light.   Cardiovascular:      Rate and Rhythm: Normal rate.      Pulses: Normal pulses.   Pulmonary:      Effort: Pulmonary effort is normal.      Breath sounds: Normal breath sounds.   Abdominal:      General: Abdomen is flat. There is no distension.      Palpations: Abdomen is soft.      Tenderness: There is no abdominal tenderness.   Musculoskeletal:      Cervical back: Normal range of motion and neck supple.   Skin:     General: Skin is warm.   Neurological:      Mental Status: He is alert and oriented to person, place, and time.       Patient has right facial droop less  sensation in his right face.      CT head wo IV  "contrast    Result Date: 1/25/2024  Interpreted By:  Danielito Lara, STUDY: ASDA FRENCH; 1/25/2024 10:20 am   INDICATION: Signs/Symptoms:possible stroke;   COMPARISON: 01/11/2017   ACCESSION NUMBER(S): [id number]   ORDERING CLINICIAN: ASAD FRENCH   TECHNIQUE: Contiguous axial images were acquired from the vertex through the posterior fossa without IV contrast. All CT examinations are performed with 1 or more of the following dose reduction techniques: Automated exposure control, adjustment of mA and/or kv according to patient's size, or use of iterative reconstruction techniques.   FINDINGS: No focal mass effect or midline shift is identified. The ventricles and sulci are symmetric and appropriate for the patient's age.   The gray white matter differentiation is preserved.   No acute intracranial hemorrhage is seen. No intra-axial or extra-axial fluid collection is seen.   The visualized paranasal sinuses and mastoid air cells are clear.       No CT evidence for acute intracranial pathology.   Signed by: Danielito Lara 1/25/2024 10:31 AM Dictation workstation:   [id number]      Last Recorded Vitals  Blood pressure (!) 159/97, pulse 77, temperature 36.9 °C (98.4 °F), temperature source Oral, resp. rate 15, height 1.829 m (6'), weight 77.1 kg (170 lb), SpO2 99 %.  Intake/Output last 3 Shifts:  No intake/output data recorded.    Relevant Results    Lab Results   Component Value Date    WBC 5.6 01/25/2024    HGB 14.5 01/25/2024    HCT 42.8 01/25/2024    MCV 88 01/25/2024     01/25/2024       Lab Results   Component Value Date    GLUCOSE 115 (H) 01/25/2024    CALCIUM 9.8 01/25/2024     01/25/2024    K 3.4 01/25/2024    CO2 29 01/25/2024    CL 97 01/25/2024    BUN 13 01/25/2024    CREATININE 0.90 01/25/2024       No results found for: \"HGBA1C\"      CT head wo IV contrast    Result Date: 1/25/2024  Interpreted By:  Danielito Lara, STUDY: ASAD FRENCH; 1/25/2024 10:20 am   " INDICATION: Signs/Symptoms:possible stroke;   COMPARISON: 01/11/2017   ACCESSION NUMBER(S): [id number]   ORDERING CLINICIAN: ASAD FRENCH   TECHNIQUE: Contiguous axial images were acquired from the vertex through the posterior fossa without IV contrast. All CT examinations are performed with 1 or more of the following dose reduction techniques: Automated exposure control, adjustment of mA and/or kv according to patient's size, or use of iterative reconstruction techniques.   FINDINGS: No focal mass effect or midline shift is identified. The ventricles and sulci are symmetric and appropriate for the patient's age.   The gray white matter differentiation is preserved.   No acute intracranial hemorrhage is seen. No intra-axial or extra-axial fluid collection is seen.   The visualized paranasal sinuses and mastoid air cells are clear.       No CT evidence for acute intracranial pathology.   Signed by: Danielito Lara 1/25/2024 10:31 AM Dictation workstation:   [id number]     Scheduled medications  aspirin, 81 mg, oral, Daily  atorvastatin, 40 mg, oral, Nightly  enoxaparin, 40 mg, subcutaneous, Nightly      Continuous medications     PRN medications  PRN medications: acetaminophen, alum-mag hydroxide-simeth, guaiFENesin, melatonin, ondansetron, ondansetron    Assessment/Plan   Principal Problem:    CVA (cerebrovascular accident due to intracerebral hemorrhage) (CMS/[facility])  Active Problems:    High cholesterol    Ear infection    TIA versus CVA versus Bell's palsy  Pt has right facial droop since Monday  MRI/MRA ultrasound of carotid pending   D echo pending   lipid panel TSH level pending     Elevated blood pressure  Blood pressure slightly elevated 159/97  Patient never been diagnosis with high blood pressure not on any medication  Monitor    Right ear infection  No pain   Pt took     DVT prophylaxis  On Lovenox    Discharge plan:  Anticipate discharging patient home when medically clear     I spent 45 minutes  in the professional and overall care of this patient.    Maria Del Carmen Acosta, APRN-CNP    Addendum:  I personally saw and examined patient in the emergency room  Discussed with Maria Del Carmen Acosta NP.  Agree with her assessment and plan.

## 2024-01-25 NOTE — PROGRESS NOTES
Speech-Language Pathology                 Therapy Communication Note    Patient Name: Sohan Valdez  MRN: [id number]  Today's Date: 1/25/2024     Discipline: Speech Language Pathology    Missed Visit Reason:  Pt off floor for ulatrasound, will attempt later when pt available    Missed Time: Attempt    Comment:

## 2024-01-25 NOTE — NURSING NOTE
Pt admitted from Ed with diagnosis of CVA vs Junior palsy. Alert and orientedx4 ,no neurological deficit c/o some numbness around mouth.Sr on telemetry.

## 2024-01-25 NOTE — PROGRESS NOTES
01/25/24 1123   Physical Activity   On average, how many days per week do you engage in moderate to strenuous exercise (like a brisk walk)? 0 days   On average, how many minutes do you engage in exercise at this level? 0 min   Financial Resource Strain   How hard is it for you to pay for the very basics like food, housing, medical care, and heating? Not hard   Housing Stability   In the last 12 months, was there a time when you were not able to pay the mortgage or rent on time? N   In the last 12 months, how many places have you lived? 1   In the last 12 months, was there a time when you did not have a steady place to sleep or slept in a shelter (including now)? N   Transportation Needs   In the past 12 months, has lack of transportation kept you from medical appointments or from getting medications? no   In the past 12 months, has lack of transportation kept you from meetings, work, or from getting things needed for daily living? No   Food Insecurity   Within the past 12 months, you worried that your food would run out before you got the money to buy more. Never true   Within the past 12 months, the food you bought just didn't last and you didn't have money to get more. Never true   Stress   Do you feel stress - tense, restless, nervous, or anxious, or unable to sleep at night because your mind is troubled all the time - these days? Not at all   Social Connections   In a typical week, how many times do you talk on the phone with family, friends, or neighbors? More than 3   How often do you get together with friends or relatives? Never  (Pt said that he only sees his immediate family daily, does not see friends or extended family weekly)   How often do you attend Sikhism or Jewish services? Never   Do you belong to any clubs or organizations such as Sikhism groups, unions, fraternal or athletic groups, or school groups? No   How often do you attend meetings of the clubs or organizations you belong to? Never    Are you , , , , never , or living with a partner?    Intimate Partner Violence   Within the last year, have you been afraid of your partner or ex-partner? No   Within the last year, have you been humiliated or emotionally abused in other ways by your partner or ex-partner? No   Within the last year, have you been kicked, hit, slapped, or otherwise physically hurt by your partner or ex-partner? No   Within the last year, have you been raped or forced to have any kind of sexual activity by your partner or ex-partner? No   Alcohol Use   Q1: How often do you have a drink containing alcohol? 2-4 pr month   Q2: How many drinks containing alcohol do you have on a typical day when you are drinking? 1 or 2   Q3: How often do you have six or more drinks on one occasion? Never   Utilities   In the past 12 months has the electric, gas, oil, or water company threatened to shut off services in your home? No

## 2024-01-25 NOTE — PROGRESS NOTES
01/25/24 1126   Discharge Planning   Living Arrangements Spouse/significant other   Support Systems Spouse/significant other   Type of Residence Private residence   Number of Stairs to Enter Residence 3   Number of Stairs Within Residence 12   Do you have animals or pets at home? No   Who is requesting discharge planning? Provider   Home or Post Acute Services None   Patient expects to be discharged to: Home   Does the patient need discharge transport arranged? No   Financial Resource Strain   How hard is it for you to pay for the very basics like food, housing, medical care, and heating? Not hard   Housing Stability   In the last 12 months, was there a time when you were not able to pay the mortgage or rent on time? N   In the last 12 months, how many places have you lived? 1   In the last 12 months, was there a time when you did not have a steady place to sleep or slept in a shelter (including now)? N   Transportation Needs   In the past 12 months, has lack of transportation kept you from medical appointments or from getting medications? no   In the past 12 months, has lack of transportation kept you from meetings, work, or from getting things needed for daily living? No   Patient Choice   Patient / Family choosing to utilize agency / facility established prior to hospitalization No                  Relieving Referring Provider:Wesley Vences MD   PCP: Blake Hill MD       Video Progress Note    SUBJECTIVE    Chief Complaint   Patient presents with   • Follow-up   • Md Call   • Video Visit   • Back Pain   • Back Problem   • Neck Pain   • Headache   • Neurologic Problem     Central cord syndrome and cervical myelopathy   • Neuropathy        History of Present Illness:  Location of pain:  This is a 59 year old female with complaints of chronic bilateral neck pain, chronic bilateral lower back pain, and generalized weakness, numbness, and pain involving both arms and both legs, right side worse than left, following an injury to her head and neck after falling on the ice, February, 2022.  She had loss of consciousness following the head trauma and concussion.  She has persistent headaches, right side worse than left.  She is confined mostly to bed, use of a wheelchair, and rarely use of a walker at home.  She was diagnosed with a central cord syndrome.  She has a cervical myelopathy.  She has a C4-5 herniated disc which may require surgery.  She is status post L4-S1 fusion in 2005.  She has a chronic L1-2 herniated disc.  EMG, 7/20/22, of the upper extremities showed no evidence of cervical radiculopathy or peripheral neuropathy.    Interval History:  Chronic problem(s) is gradually worsening.  Radiation of pain: Yes, she has radiation of pain into both arms and both legs, right side worse than left.    Numbness and tingling: Yes, she has numbness and tingling in both arms and both legs, right side worse than left.    Weakness: Yes, she has weakness of both arms and both legs, right worse than left.   She is a right-handed female.    Onset of pain:  Low back pain has been for many years following a lumbar fusion.  Neck pain and generalized numbness and weakness following a concussion in February, 2022.  Rating of pain:  She rates her pain as moderate at 8/10.  Pain may be as severe as 10/10.  Quality  of pain: aching, dull, sharp, and stabbing.  Factors that worsen pain: lifting, prolonged sitting, prolonged standing, prolonged walking, bending, and twisting.  Factors that alleviate pain: nothing.  The patient has tried PT.  Physical therapy was discontinued because of her cord injury.     She is mostly complaining of lower back pain with right leg numbness.  Walking a little at home.  Uses a wheelchair otherwise.  She is complaining of tailbone pain.    Neuro surgery, Dr. CHRISTIN Tejada evaluation, 9/13/22:  \"Neck and low back pain.  History of lumbar fusion.  On home oxygen.  Not a good candidate for surgery because of comorbidities.  Recommend injection therapy.\"    Pain is worsening.  Weakness worsening both arms and legs.  Difficult for her to get comfortable.    Lower extremity EMG has been ordered.    [facility] referral issued by neurology.  Scheduled to see Juni Sutton, 11/2/22, for a 2nd neurosurgical opinion.    Trying to get in with Dr. Beebe for injection therapy.    She is using Norco 10/325 mg, 4 per day.  She uses gabapentin 400 mg, 3 per day.  She uses Cyclobenzaprine 10 mg, up to 3 times per day.    Previous pain and other pertinent providers: Yes    [facility] admission, 7/10/22:  Altered mental status.  Tox screen positive for opiates.  The patient's son told the admitting physician that she was drinking alcohol yesterday, 7/9/22.    PCP, 4/27/22, Isabella Booth, NP:  \"Central cord syndrome.  Patient started on Norco and gabapentin for pain control.  History of C4-5 disc herniation. Patient to have a upper extremity EMG.  Patient complaining of head and neck pain.  Mediastinal lymphadenopathy.\"    Neurosurgery, Dr. CHRISTIN Tejada, 4/14/22:  \"Central cord syndrome.  Cervical myelopathy.  Right-sided headaches.  Previous L4-S1 fusion, 2005.  Chronic L1-2 herniated disc.  Begin physical therapy.  Consider an epidural injection.  May require C4-5 surgery.\"  Neuro surgery, Dr. CHRISTIN Tejada evaluation,  9/13/22:  \"Neck and low back pain.  History of lumbar fusion.  On home oxygen.  Not a good candidate for surgery because of comorbidities.  Recommend injection therapy.\"    Oncology, Dr. Valverde, 4/11/22: \"Mediastinal lymphadenopathy.  Slowly progressed over the last 6 years.  Workup in progress.\"    Dr. Rahman's office did not accept this patient's insurance.    [facility], REE Calvin, (Hieu Lopez D.O.), 7/19/22:  \"Cervicogenic headaches.  Increase gabapentin 400 mg, 3 per day.  And cyclobenzaprine 10 mg, 3 per day.\"    Pericarditis in 2013, and sees cardiology.    S/p CTR, 2017.    OBJECTIVE    Pertinent surgeries: Yes  S/P L4-S1 posterior fusion, R=L, Dr. Robles, 2005.    Pertinent pain procedures: No    Pertinent diagnostic studies: Yes  Lumbar MRI, 4/8/22:  1. Mild broad-based disc protrusion at L1-2 with moderate bilateral foraminal narrowing.  2. Anterior and posterior spinal fusion change from L4 to S1 without definite canal or foraminal compromise.    Cervical MRI, 3/4/22: 1.  No evidence of acute traumatic injury in the cervical spine or evidence for cord signal abnormality.  2.  Multilevel degenerative changes superimposed upon a developmentally narrowed spinal canal, as described.   3.  At C4-C5, there is a prominent central disc extrusion with superior migration with degenerative changes resulting in moderate to severe spinal canal stenosis and deformity of the cord without cord signal abnormality.   4.  Varying degrees of neural foraminal narrowing, moderate to severe on the left at C3-C4.  5.  Partially imaged soft tissue in anterior mediastinum is incompletely evaluated. Outside hospital CT chest of 7/7/2017 demonstrated mediastinal lymphadenopathy. Correlate with history. If clinically indicated, consider follow-up CT chest.    EMG, 2/1/17:  Severe right carpal tunnel syndrome and moderate left carpal tunnel syndrome.  No evidence of radiculopathy.    EMG, 9/17/14:  No evidence  of radiculopathy.  No peripheral neuropathy.    EMG, 7/20/22:  NO evidence of cervical radiculopathy or peripheral neuropathy.    Pertinent laboratories:  Creatinine (mg/dL)   Date Value   05/11/2022 0.58      GFR Estimate, Non  (no units)   Date Value   01/10/2020 >90      Hemoglobin A1C (%)   Date Value   10/11/2021 6.0 (H)     Pertinent history:  Chronic pain  Upper and lower extremity pain  Cervicogenic headaches  Osteoarthritis  History of pericardial effusion  Bilateral carpal tunnel syndrome  Cervical myelopathy  Cervical radiculopathy  Central cord syndrome  History of concussion  GERD  Hypothyroid  History of Afib  Fibromyalgia  Depression  Insomnia  Quit smoking 1993    Social history:  The patient is . Two of her sons live with her.  Progeny: Yes, 4 children.  The patient does not work.  On disability.    Physical exam:  Constitutional:       Visit Vitals  LMP  (LMP Unknown)          General: Alert and Oriented to Person, Place, Time, appears stated age, in moderate distress, cooperative, appropriately dressed, and appropriately groomed. Overweight.  Does not appear somnolent nor intoxicated.  Psychiatric: The patient has normal insight and judgment. Affect is normal. The patient appears depressed. Memory is Intact.        ASSESSMENT     Current controlled substances:  Gabapentin 400 mg, 3/day  Cyclobenzaprine 10 mg, 3 per day  Norco 10/325 mg, 4/day (40 MME)    Previously tried controlled substances:  Tramadol 50 mg  Norco 5/325 mg, 20 pills, 6/15/22  Gabapentin 100 mg, 90 pills, 4/27/22    The patient has signed an appropriate pain management agreement.    Toxicology screening has been done.  4/27/22:  Positive for hydrocodone.  Negative for gabapentin.  7/7/22, oral swab: Positive for gabapentin.  Negative for hydrocodone.  Possibly appropriate depending on when she last had hydrocodone.  8/2/22: Positive for cyclobenzaprine, gabapentin, and hydrocodone.   Appropriate.    [place] PDMP reviewed; no aberrant behavior identified, prescription authorized.    A pill count has been done and shows an appropriate amount.    Opiate risk assessment tool:  SOAPP = 1; risk level is low .    Pain control and functional assessment:  BPI (Brief Pain Inventory)  AVERAGE PAIN LEVEL (Question 5 from BPI): N/A (PREVIOUSLY 10/10, 10/10, 10/10)  BPI FUNCTIONAL INTERFERENCE SCORE (Sum BPI Question 9): N/A (PREVIOUSLY 88, 90, 84)   BPI MOOD INTERFERENCE IN LIFE SCORE (BPI Question 9 B): N/A (PREVIOUSLY 9, 10, 10)    Evaluation for long-term opioid therapy:  Meeting functional goals? Yes  The patient was advised in an informed consent the fact that having opioids continually present in the blood stream increases the annualized risk of dying from a heart problem by 65%.  In light of this, did the patient want to continue their opioids?  Yes  Has patient been prescribed naloxone for use at home if necessary (greater than or equal to 50 MME's or has respiratory compromise)?  No    PEG (to be used for phone calls and recheck video Evisits):  On a scale from 0-10:  How bad has your average Pain been over the past week?  10/10  How much has it interfered with your Emotional state?  10/10  How much has it interfered with your General activity?  10/10    Signs of aberrant behavior are present.  Hospital admission 7/10/22 for altered mental status, due to alcohol intoxication combined with opiate usage.    Risk of opiate abuse:  Moderate.    PLAN    Discussion:   Norco was increased to 10/325 mg, 4 tablets per day.  Gabapentin was increased to 400 mg, 3 per day.  Cyclobenzaprine 10 mg, 3 per day.  Consider starting an antidepressant.     Lower extremity EMG has been ordered.    Dr. CHRISTIN Tejada did not recommend surgery.  She is to see Juni Sutton DO for a 2nd neurosurgical evaluation.    She was unable to get into Dr. Rahman for injection procedures.  She is trying to get in with   [facility] for spinal injection procedures.    The patient was warned about self escalation of opiates.  She was also warned about excessive use of alcohol and the combination with opiates may lead to overdosage.    Assessment:  1. Chronic pain syndrome    2. Central cord syndrome, sequela (CMS/HCC)    3. Cervical radiculopathy    4. Lumbar radiculopathy    5. Herniation of intervertebral disc of cervical spine with myelopathy    6. S/P lumbar fusion    7. Pain management contract agreement    8. Long term current use of opiate analgesic         Plan:  Orders Placed This Encounter   • HYDROcodone-acetaminophen (NORCO)  MG per tablet     Sig: Take 1 tablet by mouth 4 times daily as needed for Pain. Do not start before October 22, 2022.     Dispense:  120 tablet     Refill:  0     Order Specific Question:   Indication:     Answer:   Chronic Pain     Order Specific Question:   Delegates - In compliance with state law, the Prescription Drug Monitoring Program must be reviewed prior to signing any order for a controlled substance.     Answer:   PDMP Reviewed by Delegate - No Unexpected Activity        Follow up: 1 month.    This visit was performed via live interactive two-way video.  During their visit, the patient was informed that their consent to treat includes permission to submit claims to their insurance on their behalf for the services received.  Clinician Location: [facility] [address]   Patient Location: Home      I spent a total of 30 minutes on the day of the visit.  This includes pre-charting, chart review and documenting.

## 2024-01-25 NOTE — PROGRESS NOTES
Occupational Therapy    Evaluation    Patient Name: Sohan Valdez  MRN: [id number]  Today's Date: 1/25/2024  Time Calculation  Start Time: 1200  Stop Time: 1220  Time Calculation (min): 20 min        Assessment:  Prognosis: Good  Evaluation/Treatment Tolerance: Patient tolerated treatment well  End of Session Patient Position: On cart  Prognosis: Good  Evaluation/Treatment Tolerance: Patient tolerated treatment well  Strengths: Attitude of self, Premorbid level of function  Plan:  No Skilled OT: At baseline function  OT Frequency: OT eval only  OT Discharge Recommendations: No further acute OT  OT Recommended Transfer Status: Stand by assist (multiple lines)  OT - OK to Discharge: Yes       Subjective   Current Problem:  1. Right facial numbness        2. CVA (cerebrovascular accident due to intracerebral hemorrhage) (CMS/HCC)  Vascular US Carotid Artery Duplex Bilateral    Transthoracic Echo (TTE) Complete    Vascular US Carotid Artery Duplex Bilateral    Transthoracic Echo (TTE) Complete        General:  General  Reason for Referral: OT Eval and treat  Past Medical History Relevant to Rehab:  (ear infection R with antibiotics 1/22/24, facial droop/numbness, CT (-) MRI ordered. R/O stroke.)  Family/Caregiver Present: No  Prior to Session Communication: Bedside nurse  Patient Position Received: On cart  Preferred Learning Style: auditory    Pain:  Pain Assessment  Pain Assessment: 0-10  Pain Score: 0 - No pain    Objective   Cognition:  Overall Cognitive Status: Within Functional Limits    Home Living:  Type of Home: House  Lives With: Spouse  Home Layout: Multi-level  Home Access: Stairs to enter with rails  Entrance Stairs-Rails: Right  Bathroom Shower/Tub: Tub/shower unit  Bathroom Toilet: Standard  Bathroom Accessibility:  (full bath on second floor)  Prior Function:  Level of [place]: Independent with ADLs and functional transfers, Independent with homemaking with ambulation  IADL History:  Mode of  Transportation: Car  Occupation: Full time employment  ADL:  Eating Assistance: Independent  Grooming Assistance: Independent  Bathing Assistance: Independent  UE Dressing Assistance: Independent  LE Dressing Assistance: Independent  Toileting Assistance with Device: Independent  Functional Assistance: Independent  Activity Tolerance:  Endurance: Endurance does not limit participation in activity  Bed Mobility/Transfers: Bed Mobility  Bed Mobility:  (independent)    Transfers  Transfer:  (independent without LOB/AD)     Sensation:  Sensation Comment:  (no deficits)  Strength:  Strength Comments:  (WFL and symmetrical)    Hand Function:  Gross Grasp: Functional  Coordination: Functional    Outcome Measures:[facility] Daily Activity  Putting on and taking off regular lower body clothing: None  Bathing (including washing, rinsing, drying): None  Putting on and taking off regular upper body clothing: None  Toileting, which includes using toilet, bedpan or urinal: None  Taking care of personal grooming such as brushing teeth: None  Eating Meals: None  Daily Activity - Total Score: 24        Education Documentation  ADL Training, taught by Payton Diaz OT at 1/25/2024 12:55 PM.  Learner: Patient  Readiness: Acceptance  Method: Explanation  Response: Verbalizes Understanding    Education Comments  No comments found.        OP EDUCATION:       Goals:

## 2024-01-25 NOTE — PROGRESS NOTES
01/25/24 1127   Current Planned Discharge Disposition   Current Planned Discharge Disposition Home

## 2024-01-25 NOTE — ED PROVIDER NOTES
HPI   Chief Complaint   Patient presents with    Facial Droop       Patient is a 59-year-old male with no significant past medical history presenting with right facial numbness x 2 days.  Says that for the past 2 days or so, he has felt right-sided facial numbness around his lips and right cheek.  Was recently prescribed Augmentin and steroids to be taken for potential ear infection, which he started on Monday.  Denies history of stroke or TIA.  Denies fevers, chills, chest pain, shortness of breath, abdominal pain, nausea, vomiting, diarrhea.  Patient says that he does not regularly see a primary care doctor and was started on cholesterol medicine sometime ago but subsequently stopped.                           [place] Coma Scale Score: 15         NIH Stroke Scale: 2          Patient History   Past Medical History:   Diagnosis Date    CVA (cerebrovascular accident due to intracerebral hemorrhage) (CMS/[facility]) 2024     History reviewed. No pertinent surgical history.  No family history on file.  Social History     Tobacco Use    Smoking status: Former     Types: Cigarettes     Quit date:      Years since quittin.0    Smokeless tobacco: Never   Substance Use Topics    Alcohol use: Not on file    Drug use: Not on file       Physical Exam   ED Triage Vitals [24 0924]   Temperature Heart Rate Respirations BP   36.9 °C (98.4 °F) 97 16 (!) 174/98      Pulse Ox Temp Source Heart Rate Source Patient Position   99 % Oral -- Sitting      BP Location FiO2 (%)     Right arm --       Physical Exam  Constitutional:       Appearance: Normal appearance.      Comments: Awake, laying in examination bed.   HENT:      Head: Normocephalic and atraumatic.      Comments: Decreased sensation to touch of right side compared to left     Right Ear: Tympanic membrane, ear canal and external ear normal.      Left Ear: Tympanic membrane, ear canal and external ear normal.      Nose: Nose normal.      Mouth/Throat:      Mouth:  Mucous membranes are moist.      Pharynx: Oropharynx is clear.      Comments: Asymmetry on patient's left side when attempting to puff out both cheeks  Eyes:      Extraocular Movements: Extraocular movements intact.      Pupils: Pupils are equal, round, and reactive to light.      Comments: Unable to completely close right eye   Cardiovascular:      Rate and Rhythm: Normal rate and regular rhythm.      Pulses: Normal pulses.      Heart sounds: Normal heart sounds.   Pulmonary:      Effort: Pulmonary effort is normal.      Breath sounds: Normal breath sounds.   Abdominal:      General: Abdomen is flat.      Palpations: Abdomen is soft.   Musculoskeletal:      Cervical back: Normal range of motion and neck supple.   Skin:     General: Skin is warm and dry.   Neurological:      General: No focal deficit present.      Mental Status: He is alert and oriented to person, place, and time.   Psychiatric:         Mood and Affect: Mood normal.         Behavior: Behavior normal.         ED Course & MDM   ED Course as of 01/25/24 1100   Thu Jan 25, 2024   1030 EKG on my interpretation at 10:05 AM shows normal sinus rhythm, rate of 72 beats minute.  Normal axis.  QTc 427 ms, CT interval 130.  No ST elevation or depression, no acute ischemic pattern.  No STEMI.  Unremarkable EKG. [NT]   1046 I spoke with Dr. Becerra, Neurology, who says that this may be stroke vs. Bell's Palsy. He recommended admission for further management with MRI and subsequent testing.  [AR]      ED Course User Index  [AR] Mendez Alcantara PA-C  [NT] Fuad Trevino,          Diagnoses as of 01/25/24 1100   Right facial numbness       Medical Decision Making  Patient is a 51-year-old male with no significant past medical history presenting with right-sided facial numbness.  Initial considered include but not limited to: Stroke, Bell's palsy, dental infection, ear infection.  When having patient attempt to puff out both of his cheeks, there is a  "noticeable decrease on the left side. Right sided facial sensory deficit present on physical exam.  Patient is able to raise his eyebrows.  Blood pressure initially elevated, which patient states may be due to the environment he is in.  He states that he is not \"a fan of hospitals\".    Basic labwork, EKG, troponin, and head CT ordered.  CBC without infection or anemia.  CMP without significant electrolyte abnormality.  Initial troponin within normal limits.  CT head showed no acute intracranial pathology.  EKG showed normal sinus rhythm.    I spoke with Dr. Zhang.  We thoroughly discussed the patient's history, physical, laboratory and imaging findings as well as ED course.  After our discussion, we decided to admit this patient for stroke workup.  Neurology was consulted prior to discussion with Dr. Zhang.  As I deemed necessary from the patient's history, physical, laboratory and imaging findings as well as ED course, I considered the above listed diagnoses.    Portions of this note made with Dragon software, please be mindful of potential grammatical errors.        Labs Reviewed   COMPREHENSIVE METABOLIC PANEL - Abnormal       Result Value    Glucose 115 (*)     Sodium 137      Potassium 3.4      Chloride 97      Bicarbonate 29      Urea Nitrogen 13      Creatinine 0.90      eGFR >90      Calcium 9.8      Albumin 4.8      Alkaline Phosphatase 75      Total Protein 7.8      AST 17      Bilirubin, Total 1.6 (*)     ALT 12      Anion Gap 11     SERIAL TROPONIN, INITIAL ([facility]) - Normal    Troponin T, High Sensitivity 14     CBC WITH AUTO DIFFERENTIAL    WBC 5.6      nRBC 0.0      RBC 4.84      Hemoglobin 14.5      Hematocrit 42.8      MCV 88      MCH 30.0      MCHC 33.9      RDW 12.3      Platelets 252      Neutrophils % 63.9      Immature Granulocytes %, Automated 0.4      Lymphocytes % 27.9      Monocytes % 6.9      Eosinophils % 0.4      Basophils % 0.5      Neutrophils Absolute 3.60      Immature " Granulocytes Absolute, Automated 0.02      Lymphocytes Absolute 1.57      Monocytes Absolute 0.39      Eosinophils Absolute 0.02      Basophils Absolute 0.03     TROPONIN T SERIES, HIGH SENSITIVITY (0, 2 HR, 6 HR)    Narrative:     The following orders were created for panel order Troponin T Series, High Sensitivity (0, 2HR, 6HR).  Procedure                               Abnormality         Status                     ---------                               -----------         ------                     Serial Troponin, Initial...[[id number]]  Normal              Final result               Serial Troponin, 2 Hour ...[[id number]]                                                   Please view results for these tests on the individual orders.   SERIAL TROPONIN,  2 HOUR ([facility])         CT head wo IV contrast   Final Result   No CT evidence for acute intracranial pathology.        Signed by: Danielito Lara 1/25/2024 10:31 AM   Dictation workstation:   [id number]            Procedure  Procedures     Mendez Alcantara PA-C  01/25/24 1101       Mendez Alcantara PA-C  01/25/24 1101

## 2024-01-29 LAB
ATRIAL RATE: 72 BPM
P AXIS: 60 DEGREES
P OFFSET: 213 MS
P ONSET: 156 MS
PR INTERVAL: 130 MS
Q ONSET: 221 MS
QRS COUNT: 11 BEATS
QRS DURATION: 88 MS
QT INTERVAL: 390 MS
QTC CALCULATION(BAZETT): 427 MS
QTC FREDERICIA: 414 MS
R AXIS: 20 DEGREES
T AXIS: 28 DEGREES
T OFFSET: 416 MS
VENTRICULAR RATE: 72 BPM

## 2024-02-15 NOTE — PROGRESS NOTES
"Linda Valdez is a 59 y.o. male who presents as a NPV and HOSPITAL FOLLOW-UP to ESTABLISH PCP CARE and for CARE GAP REVIEW..    HPI:        58 yo male EX-SMOKER (1/2 ppd x 15 years= 7.5 pack-years; quit 1999) presenting as a NPV to and HOSPITAL FOLLOW-UP to ESTABLISH PCP CARE and for CARE GAP REVIEW..     EMR/EPIC records reviewed.    Admitted 1/25/24 and discharged 1/25/24 for RIGHT sided facial droop and weakness to r/o Bell's Balsy vs Stroke.  Per Hospital Discharge Summary 1/25/24:    \"Hospital Course  This is 59 years old male with no significant past medical history except high cholesterol.  Patient presented to [facility] ER with complaint of right facial numbness and drooping x 2 days .  Blood pressure was slightly high has to follow-up with his primary doctor as outpatient.  Seen  by neurology his right facial drooping is related to Bell's palsy.  Has to follow-up with neurology as outpatient.  Patient is hemodynamically stable.      Physical exam  Physical Exam  General: alert, no diaphoresis   HENT: Right-sided facial drooping . mucous membranes moist, external ears normal, no rhinorrhea   Eyes: no icterus or injection, no discharge   Lungs: CTA BL   Heart: RRR, no murmurs, no LE edema BL   GI: abdomen soft, nontender, nondistended, BS present   MSK: no joint effusion or deformity   Skin: no rashes, erythema, or ecchymosis   Neuro: grossly normal cognition, motor strength, sensation    Medication List      START taking these medications     atorvastatin 40 mg tablet; Commonly known as: Lipitor; Take 1 tablet (40   mg) by mouth once daily at bedtime.   white petrolatum-mineral oiL 94-3 % ophthalmic ointment; Commonly known   as: Tears Naturale PM; Apply 1 Application to both eyes once daily at   bedtime.        Outpatient Follow-Up  No future appointments.\"       Seen by Neurology during admission 1/25/24.  Per Neurology provider note:    Date of Service: 1/25/2024, Hospital Day: 1    "   Inpatient consult to Neurology  Consult performed by: Randall Becerra MD  Consult ordered by: Eliz hZang MD  Reason for consult: facial weakness  Assessment/Recommendations:      Impressions: Right Bell's palsy - likely HSV related and not VZV. No trigger identified in this otherwise healthy man. At risk for stroke and in the slight possibility of stroke we had to rule it out. His neurological examination is completely normal except for cranial nerve VII and only on the right. He has a Bell's phenomenon but is at risk of developing a corneal drying/ulcer OD.      Recommendations/Plans: Establish a PCP with Dr. Monge/ADY Denis-CNP to lower stroke lists. TIA/stroke 911 precautions given. He is neurologically stable to go home tonight with Lacri-Lube nightly.  Steroids are optional at this point and antivirals are not helpful.  He was warned about the facial disfigurement and given the example of Chetan Melanygiovanni to ease him into the unfamiliarity and the long-term possibilities.  He was also warned about crocodile tears.  No neurological outpatient follow-up is necessary.        Inpatient consult to Neurology  Consult performed by: Randall Becerra MD  Consult ordered by: Mendez Alcantara PA-C        History Of Present Illness: Sohan is a 59-year-old right-handed man former smoker past medical history of hypertension and hyperlipidemia (untreated) who has a rather unusual story as follows: He just did not feel well last week whelping pain behind his right ear later moved into his ear and sought help from urgent care on 1/22/2024.  They said they could see something happening in that ear and gave him antibiotics and steroid tapering he did not start the steroids until Tuesday.  He says that he had an asymmetry of his face for a few days (very vague historian, seem anxious and overwhelmed) but it was the numbness in the right side of his face that sent him to the [facility] emergency room today from work.   "Upon arrival blood pressure was elevated (159/97 mmHg).  CT head without IV contrast (1/25/2024, 1031) was normal.  The ED course neurological course is documented in my ED note and because of the possibility that he had a brainstem stroke instead he was kept for neuroimaging completed this afternoon.  MRI brain (1/25/2024) was unremarkable.  MRA brain (1/25/24) was also normal. He doesn't feel any different now than this morning - feel odd in his face, pointing to the left side of his face as different than his right. No pain. OD has decreased vision from childhood due to amblyopia but does not feel like a foreign object is in there no pain or redness. . Otological review of systems also normal.      Medical Decision Making:  Vascular US Carotid Artery Duplex Bilateral (1/25/2024): Less than 50% stenosis of the bilateral internal carotid arteries.       MR angio head wo IV contrast (1/25/2024): Within normal limits. I personally reviewed these images and concur with the radiological interpretation.       MR brain wo IV contrast (1/25/2024 2:29 pm): Within normal limits. I personally reviewed these images and concur with the radiological interpretation.       CT head wo IV contrast (1/25/2024): Unremarkably normal. I personally reviewed these images and concur with the radiological interpretation.  \"       PMHx:  -elevated BP==> was not starting on BP medications when admitted 1/25/24  -hyperlipidemia (, , ; 1/25/24)==> started on atorvastatin 40 mg daily  -Bell's palsy==> on white petrolatum-mineral oiL 94-3 % ophthalmic ointment; Commonly known as: Tears Naturale PM; Apply 1 Application to both eyes once daily at  bedtime to prevent eye ulceration  -elevated glucose 111; 1/25/24  -elevated bilirubin 1.6; 1.25/24    Healthcare Providers:  PCP: none  Neurology:  NONE==>  ORDERED REFERRAL to NEUROLOGY TODAY 2/16/24  Ophthalmology: NONE==> ORDERED REFERRAL to OPHTHALMOLOGY TODAY " 24    Preventive Health Services:  -Last physical: 5+ years  -last PSA screening: ?  -last colonoscopy: per patient over 10 years ago==> NOW DUE  -last STI screening:?  -Hep C screening: ?    Immunizations:   -Childhood vaccines: completed per patient    -COVID vaccine and booster: DID NOT RECEIVE  -updated COVID spike vaccine: NOW DUE  -Flu vaccine: NOW DUE  -TDAP:   NOW DUE  -shingles: NOW DUE      There is no immunization history on file for this patient.      Today Sohan  reports:    - doing and feeling well since hospital discharge, with improved, nearly resolved right sided facial droop    He denies any facial or ear pain, or facial numbness or tingling.     -taking all medications as prescribed with no reported adverse medication side effects    -requesting refills of all medications    -does not have a scheduled neurology or ophthalmology appointment and is interested in referrals     Today he has no other reported complaints, issues, or problems.    ROS is NEG for HEADACHE, NAUSEA, VOMITING, DIARRHEA, CHEST PAIN, SOB, and BLEEDING.  Review of systems (10+) negative for all systems except for any identified issues in HPI above.      Sexually active with wife.  Denies history STIs      SHx:  -lives with wife and daughter (20 yo)  -employment:   -tobacco use: EX-SMOKER (1/2 ppd x 15 years= 7.5 pack-years; quit )  -alcohol use: 2 beers every other other weeks  -illicits: DENIES      FHx:  Cancer:  -prostate cancer: father ()  HTN: mother  DM: DENIES  Heart Disease: DENIES  Stroke: DENIES  Thyroid Disease: DENIES        Objective     /78   Pulse 74   Temp 36.3 °C (97.3 °F)   Wt 75.8 kg (167 lb)   SpO2 99%   BMI 22.65 kg/m²      Physical Examination:       GENERAL           General Appearance: well-appearing, well-developed, well-hydrated, well-nourished, no acute distress.        HEENT           NECK supple, no masses or thyromegaly, no carotid bruit.            FACE:  NEARLY RESOLVED RIGHT SIDED FACIAL DROOP       EYES           Extraocular Movements: normal, bilateral eyes LESLY, no conjunctival injection.         EARS: TMs intact, normal bilaterally, no bleeding, no effusions, no signs of infection, no cerumen or debris in ear canals.       HEART           Rate and Rhythm regular rate and rhythm. Heart sounds: normal S1S2, no S3 or S4. Murmurs: none.        CHEST           Breath sounds: Clear to IPPA, RR<16 no use of accessory muscles.        ABDOMEN           General: Neg for LKKS or masses, no scleral icterus or jaundice.        MUSCULOSKELETAL           Joints Demonstration: Neg for erythema, swelling or joint deformities. gross abnormalities no gross abnormalities.        EXTREMITIES           Lower Extremities: Neg for cyanosis, clubbing or edema.       Assessment/Plan   Problem List Items Addressed This Visit       High cholesterol    Bell palsy     Other Visit Diagnoses       Encounter to establish care    -  Primary    Encounter for screening for coronary artery disease        Hospital discharge follow-up        Mixed hyperlipidemia        Primary hypertension        Elevated glucose        Vitamin D deficiency        Encounter for hepatitis C screening test for low risk patient        Routine screening for STI (sexually transmitted infection)        Prostate cancer screening        Colon cancer screening        Immunization counseling        Serum total bilirubin elevated              Establish PCP care  -labs ordered (see A/P above for details)    Bell's Palsy mostly likely due to HSV (per inpatient neurology note 1/15/24):  -cont white petrolatum-mineral oiL 94-3 % ophthalmic ointment; Commonly known as: Tears Naturale PM; Apply 1 Application to both eyes once daily at  bedtime to prevent eye ulceration  -neurology referral ordered  -ophthalmology referral ordered  -advised patient to cont white petrolatum-mineral oiL 94-3 % ophthalmic ointment; Commonly known as:  Tears Naturale PM; Apply 1 Application to both eyes once daily at  bedtime  until seen by ophthamology  -recommend shingles vaccine; patient would like to receive at follow-up visit  -emergency Dept and 911/EMS precautions discussed and reviewed    Hyperlipidemia  -cont atorvastatin 40 mg daily  -low fat,  low cholesterol diet, regularly exercise, and limit alcohol intake    Elevated glucose:  - HgBA1c ordered    Vitamin D deficiency  -Vit D levels ordered     Hep C screening  -Hep C antibody ordered     STI Screening:  -HIV, syphilis, GC/CT, trich ordered    Colon Cancer Screening: NOW DUE   -colonoscopy ordered    Prostate Cancer Screening:  -PSA ordered    Heart Disease Screening:  -CT Heart Ca scoring ordered    Counseling:       Medication education:         Education:  The patient is counseled regarding potential side-effects of all new medications        Understanding:  Patient expressed understanding        Adherence:  No barriers to adherence identified        Immunizations Counseling  -flu vaccine, shingles vaccine, andTDAP now due==> RECEIVED TDAP TODAY 2/16/24  -recommend updated COVID spike vaccine that can be obtained at local pharmacy     FOLLOW-UP: 4 weeks to discuss and review test results and 8 weeks for PHYSICAL     Discussed recommended plan of care with patient. Patient expressed understanding and agreement with plan of care. All of patient's questions were answered at the time. Patient had no additional questions at the time.           Jessy Alvarado MD, PhD

## 2024-02-16 ENCOUNTER — OFFICE VISIT (OUTPATIENT)
Dept: PRIMARY CARE | Facility: CLINIC | Age: 59
End: 2024-02-16
Payer: COMMERCIAL

## 2024-02-16 VITALS
BODY MASS INDEX: 22.65 KG/M2 | TEMPERATURE: 97.3 F | HEART RATE: 74 BPM | OXYGEN SATURATION: 99 % | WEIGHT: 167 LBS | SYSTOLIC BLOOD PRESSURE: 130 MMHG | DIASTOLIC BLOOD PRESSURE: 78 MMHG

## 2024-02-16 DIAGNOSIS — Z76.89 ENCOUNTER TO ESTABLISH CARE: Primary | ICD-10-CM

## 2024-02-16 DIAGNOSIS — E78.2 MIXED HYPERLIPIDEMIA: ICD-10-CM

## 2024-02-16 DIAGNOSIS — Z11.3 ROUTINE SCREENING FOR STI (SEXUALLY TRANSMITTED INFECTION): ICD-10-CM

## 2024-02-16 DIAGNOSIS — I10 PRIMARY HYPERTENSION: ICD-10-CM

## 2024-02-16 DIAGNOSIS — Z13.6 ENCOUNTER FOR SCREENING FOR CORONARY ARTERY DISEASE: ICD-10-CM

## 2024-02-16 DIAGNOSIS — E78.00 HIGH CHOLESTEROL: ICD-10-CM

## 2024-02-16 DIAGNOSIS — Z71.85 IMMUNIZATION COUNSELING: ICD-10-CM

## 2024-02-16 DIAGNOSIS — R17 SERUM TOTAL BILIRUBIN ELEVATED: ICD-10-CM

## 2024-02-16 DIAGNOSIS — G51.0 BELL PALSY: ICD-10-CM

## 2024-02-16 DIAGNOSIS — Z13.1 DIABETES MELLITUS SCREENING: ICD-10-CM

## 2024-02-16 DIAGNOSIS — R73.09 ELEVATED GLUCOSE: ICD-10-CM

## 2024-02-16 DIAGNOSIS — Z12.5 PROSTATE CANCER SCREENING: ICD-10-CM

## 2024-02-16 DIAGNOSIS — Z09 HOSPITAL DISCHARGE FOLLOW-UP: ICD-10-CM

## 2024-02-16 DIAGNOSIS — E55.9 VITAMIN D DEFICIENCY: ICD-10-CM

## 2024-02-16 DIAGNOSIS — Z12.11 COLON CANCER SCREENING: ICD-10-CM

## 2024-02-16 DIAGNOSIS — Z11.59 ENCOUNTER FOR HEPATITIS C SCREENING TEST FOR LOW RISK PATIENT: ICD-10-CM

## 2024-02-16 DIAGNOSIS — Z23 ENCOUNTER FOR ADMINISTRATION OF VACCINE: ICD-10-CM

## 2024-02-16 PROCEDURE — 90715 TDAP VACCINE 7 YRS/> IM: CPT | Performed by: FAMILY MEDICINE

## 2024-02-16 PROCEDURE — 99204 OFFICE O/P NEW MOD 45 MIN: CPT | Performed by: FAMILY MEDICINE

## 2024-02-16 PROCEDURE — 3078F DIAST BP <80 MM HG: CPT | Performed by: FAMILY MEDICINE

## 2024-02-16 PROCEDURE — 3075F SYST BP GE 130 - 139MM HG: CPT | Performed by: FAMILY MEDICINE

## 2024-02-16 PROCEDURE — 1036F TOBACCO NON-USER: CPT | Performed by: FAMILY MEDICINE

## 2024-02-16 RX ORDER — ATORVASTATIN CALCIUM 40 MG/1
40 TABLET, FILM COATED ORAL NIGHTLY
Qty: 30 TABLET | Refills: 11 | Status: SHIPPED | OUTPATIENT
Start: 2024-02-16 | End: 2025-02-15

## 2024-02-16 RX ORDER — ATORVASTATIN CALCIUM 40 MG/1
40 TABLET, FILM COATED ORAL NIGHTLY
Qty: 30 TABLET | Refills: 0 | Status: SHIPPED | OUTPATIENT
Start: 2024-02-16 | End: 2024-02-16 | Stop reason: SDUPTHER

## 2024-02-16 ASSESSMENT — PATIENT HEALTH QUESTIONNAIRE - PHQ9
2. FEELING DOWN, DEPRESSED OR HOPELESS: NOT AT ALL
SUM OF ALL RESPONSES TO PHQ9 QUESTIONS 1 AND 2: 0
1. LITTLE INTEREST OR PLEASURE IN DOING THINGS: NOT AT ALL

## 2024-02-16 ASSESSMENT — PAIN SCALES - GENERAL: PAINLEVEL: 0-NO PAIN

## 2024-02-16 NOTE — PATIENT INSTRUCTIONS
It was nice meeting you today.    Please continue to take all medications as prescribed,  including your eye drops until you see the ophthalmologist, this is very important for your health.    Please go to [facility] lab or another [facility] lab facility to complete the lab testing that I ordered     Please call radiology to schedule  CT Heart Calcium score    Please call referral line to schedule appointments with: 1) neurology for Bell's palsy; 2) ophthalmology for eye exam since you are risk of eye ulceration; and 3) GI for colonoscopy    I recommend receiving the TDAP booster that prevents tetanus and other infectious disease, shingles vaccine, and  the flu vaccine. Today you received the tetanus/TDAP vaccine    I recommend the updated COVID vaccine that can be obtained at your local pharmacy    Please schedule a follow up with me in 4  weeks to discuss and review your test results and 8 weeks for a physical

## 2024-02-17 ENCOUNTER — PHARMACY VISIT (OUTPATIENT)
Dept: PHARMACY | Facility: CLINIC | Age: 59
End: 2024-02-17
Payer: COMMERCIAL

## 2024-02-17 ENCOUNTER — LAB (OUTPATIENT)
Dept: LAB | Facility: LAB | Age: 59
End: 2024-02-17
Payer: COMMERCIAL

## 2024-02-17 DIAGNOSIS — Z11.3 ROUTINE SCREENING FOR STI (SEXUALLY TRANSMITTED INFECTION): ICD-10-CM

## 2024-02-17 DIAGNOSIS — Z11.59 ENCOUNTER FOR HEPATITIS C SCREENING TEST FOR LOW RISK PATIENT: ICD-10-CM

## 2024-02-17 DIAGNOSIS — I10 PRIMARY HYPERTENSION: ICD-10-CM

## 2024-02-17 DIAGNOSIS — R17 SERUM TOTAL BILIRUBIN ELEVATED: ICD-10-CM

## 2024-02-17 DIAGNOSIS — Z13.1 DIABETES MELLITUS SCREENING: ICD-10-CM

## 2024-02-17 DIAGNOSIS — R82.90 ABNORMAL URINALYSIS: ICD-10-CM

## 2024-02-17 DIAGNOSIS — E55.9 VITAMIN D DEFICIENCY: ICD-10-CM

## 2024-02-17 DIAGNOSIS — Z12.5 PROSTATE CANCER SCREENING: ICD-10-CM

## 2024-02-17 DIAGNOSIS — Z76.89 ENCOUNTER TO ESTABLISH CARE: ICD-10-CM

## 2024-02-17 DIAGNOSIS — G51.0 BELL PALSY: ICD-10-CM

## 2024-02-17 DIAGNOSIS — E03.9 HYPOTHYROIDISM, UNSPECIFIED TYPE: Primary | ICD-10-CM

## 2024-02-17 LAB
25(OH)D3 SERPL-MCNC: 17 NG/ML (ref 31–100)
ALBUMIN SERPL-MCNC: 5 G/DL (ref 3.5–5)
ALP BLD-CCNC: 96 U/L (ref 35–125)
ALT SERPL-CCNC: 17 U/L (ref 5–40)
ANION GAP SERPL CALC-SCNC: 13 MMOL/L
APPEARANCE UR: CLEAR
AST SERPL-CCNC: 21 U/L (ref 5–40)
BACTERIA #/AREA URNS AUTO: ABNORMAL /HPF
BILIRUB SERPL-MCNC: 2.3 MG/DL (ref 0.1–1.2)
BILIRUB UR STRIP.AUTO-MCNC: NEGATIVE MG/DL
BUN SERPL-MCNC: 14 MG/DL (ref 8–25)
CALCIUM SERPL-MCNC: 10 MG/DL (ref 8.5–10.4)
CHLORIDE SERPL-SCNC: 100 MMOL/L (ref 97–107)
CO2 SERPL-SCNC: 27 MMOL/L (ref 24–31)
COLOR UR: YELLOW
CREAT SERPL-MCNC: 1 MG/DL (ref 0.4–1.6)
EGFRCR SERPLBLD CKD-EPI 2021: 87 ML/MIN/1.73M*2
EST. AVERAGE GLUCOSE BLD GHB EST-MCNC: 123 MG/DL
GLUCOSE SERPL-MCNC: 88 MG/DL (ref 65–99)
GLUCOSE UR STRIP.AUTO-MCNC: NORMAL MG/DL
HBA1C MFR BLD: 5.9 %
HCV AB SER QL: NONREACTIVE
HIV 1+2 AB+HIV1 P24 AG SERPL QL IA: NONREACTIVE
KETONES UR STRIP.AUTO-MCNC: NEGATIVE MG/DL
LEUKOCYTE ESTERASE UR QL STRIP.AUTO: NEGATIVE
MUCOUS THREADS #/AREA URNS AUTO: ABNORMAL /LPF
NITRITE UR QL STRIP.AUTO: NEGATIVE
PH UR STRIP.AUTO: 7 [PH]
POTASSIUM SERPL-SCNC: 4.5 MMOL/L (ref 3.4–5.1)
PROT SERPL-MCNC: 7.7 G/DL (ref 5.9–7.9)
PROT UR STRIP.AUTO-MCNC: NORMAL MG/DL
PSA SERPL-MCNC: 1.4 NG/ML
RBC # UR STRIP.AUTO: NEGATIVE /UL
RBC #/AREA URNS AUTO: ABNORMAL /HPF
SODIUM SERPL-SCNC: 140 MMOL/L (ref 133–145)
SP GR UR STRIP.AUTO: 1.02
T4 FREE SERPL-MCNC: 0.9 NG/DL (ref 0.9–1.7)
TSH SERPL DL<=0.05 MIU/L-ACNC: 12.42 MIU/L (ref 0.27–4.2)
UROBILINOGEN UR STRIP.AUTO-MCNC: NORMAL MG/DL
VIT B12 SERPL-MCNC: 446 PG/ML (ref 211–946)
WBC #/AREA URNS AUTO: ABNORMAL /HPF

## 2024-02-17 PROCEDURE — 36415 COLL VENOUS BLD VENIPUNCTURE: CPT

## 2024-02-17 PROCEDURE — 84153 ASSAY OF PSA TOTAL: CPT

## 2024-02-17 PROCEDURE — 83036 HEMOGLOBIN GLYCOSYLATED A1C: CPT

## 2024-02-17 PROCEDURE — 84439 ASSAY OF FREE THYROXINE: CPT

## 2024-02-17 PROCEDURE — 82306 VITAMIN D 25 HYDROXY: CPT

## 2024-02-17 PROCEDURE — 81001 URINALYSIS AUTO W/SCOPE: CPT

## 2024-02-17 PROCEDURE — 86780 TREPONEMA PALLIDUM: CPT

## 2024-02-17 PROCEDURE — 84443 ASSAY THYROID STIM HORMONE: CPT

## 2024-02-17 PROCEDURE — 86803 HEPATITIS C AB TEST: CPT

## 2024-02-17 PROCEDURE — 87086 URINE CULTURE/COLONY COUNT: CPT

## 2024-02-17 PROCEDURE — 82607 VITAMIN B-12: CPT

## 2024-02-17 PROCEDURE — 84425 ASSAY OF VITAMIN B-1: CPT

## 2024-02-17 PROCEDURE — 87389 HIV-1 AG W/HIV-1&-2 AB AG IA: CPT

## 2024-02-17 PROCEDURE — 87661 TRICHOMONAS VAGINALIS AMPLIF: CPT

## 2024-02-17 PROCEDURE — RXMED WILLOW AMBULATORY MEDICATION CHARGE

## 2024-02-17 PROCEDURE — 80053 COMPREHEN METABOLIC PANEL: CPT

## 2024-02-17 PROCEDURE — 87800 DETECT AGNT MULT DNA DIREC: CPT

## 2024-02-18 LAB
C TRACH RRNA SPEC QL NAA+PROBE: NEGATIVE
N GONORRHOEA DNA SPEC QL PROBE+SIG AMP: NEGATIVE
T VAGINALIS RRNA SPEC QL NAA+PROBE: NEGATIVE
TREPONEMA PALLIDUM IGG+IGM AB [PRESENCE] IN SERUM OR PLASMA BY IMMUNOASSAY: NONREACTIVE

## 2024-02-18 PROCEDURE — RXMED WILLOW AMBULATORY MEDICATION CHARGE

## 2024-02-18 RX ORDER — LEVOTHYROXINE SODIUM 75 UG/1
75 TABLET ORAL
Qty: 30 TABLET | Refills: 11 | Status: SHIPPED | OUTPATIENT
Start: 2024-02-18 | End: 2025-02-17

## 2024-02-19 LAB — BACTERIA UR CULT: NO GROWTH

## 2024-02-20 ENCOUNTER — PHARMACY VISIT (OUTPATIENT)
Dept: PHARMACY | Facility: CLINIC | Age: 59
End: 2024-02-20
Payer: COMMERCIAL

## 2024-02-20 PROCEDURE — RXMED WILLOW AMBULATORY MEDICATION CHARGE

## 2024-02-21 LAB — VIT B1 PYROPHOSHATE BLD-SCNC: 116 NMOL/L (ref 70–180)

## 2024-03-10 ENCOUNTER — HOSPITAL ENCOUNTER (OUTPATIENT)
Dept: RADIOLOGY | Facility: HOSPITAL | Age: 59
Discharge: HOME | End: 2024-03-10
Payer: COMMERCIAL

## 2024-03-10 DIAGNOSIS — Z13.6 ENCOUNTER FOR SCREENING FOR CORONARY ARTERY DISEASE: ICD-10-CM

## 2024-03-10 PROCEDURE — 75571 CT HRT W/O DYE W/CA TEST: CPT

## 2024-03-12 PROCEDURE — RXMED WILLOW AMBULATORY MEDICATION CHARGE

## 2024-03-13 ENCOUNTER — PHARMACY VISIT (OUTPATIENT)
Dept: PHARMACY | Facility: CLINIC | Age: 59
End: 2024-03-13
Payer: COMMERCIAL

## 2024-03-13 NOTE — PATIENT INSTRUCTIONS
It was nice seeing you today.     Please continue to take all medications as prescribed,  including levothyroxine 75 mcg daily in the morning and your eye drops until you see the ophthalmologist, this is very important for your health.     Please go to [facility] lab or another [facility] lab facility to complete the lab testing that I ordered  that includes thyroid function and checking your liver function.    I may have to adjust your thyroid medication based on your thyroid function test after it is completed.    I have ordered a [employer] colon cancer screening test that will be sent to your home. Please follow the directions and send it back to the company for processing. You can either do the cologuard or colonoscopy to screen for colon cancer. This is important to complete.     Please call referral line to schedule appointments with GI for elevated bilirubin     I recommend receiving the shingles vaccine, and  the flu vaccine.      I recommend the updated COVID vaccine that can be obtained at your local pharmacy     Please schedule a follow up with me in 8 weeks for a physical

## 2024-03-13 NOTE — PROGRESS NOTES
Linda Valdez is a 59 y.o. male who presents for FOLLOW UP VISIT TO DISCUSS and REVIEW TEST RESULTS.     HPI:        60 yo male EX-SMOKER (1/2 ppd x 15 years= 7.5 pack-years; quit 1999) presenting FOLLOW UP VISIT TO DISCUSS and REVIEW TEST RESULTS.       EMR/[facility] records reviewed.      Last seen by me on 2/16/24 as a NPV to and HOSPITAL FOLLOW-UP to ESTABLISH PCP CARE and for CARE GAP REVIEW. At visit:  -ORDERED LABS==> COMPLETED    Bell's Palsy mostly likely due to HSV (per inpatient neurology note 1/15/24):  -cont white petrolatum-mineral oiL 94-3 % ophthalmic ointment; Commonly known as: Tears Naturale PM; Apply 1 Application to both eyes once daily at  bedtime to prevent eye ulceration  -neurology referral ordered  -ophthalmology referral ordered  -advised patient to cont white petrolatum-mineral oiL 94-3 % ophthalmic ointment; Commonly known as: Tears Naturale PM; Apply 1 Application to both eyes once daily at  bedtime  until seen by ophthamology  -recommend shingles vaccine; patient would like to receive at follow-up visit  -emergency Dept and 911/EMS precautions discussed and reviewed     Colon Cancer Screening: NOW DUE   -colonoscopy ordered     Heart Disease Screening:  -CT Heart Ca scoring ordered==> COMPLETED    -flu vaccine, shingles vaccine, and TDAP now due==> RECEIVED TDAP TODAY 2/16/24  -recommend updated COVID spike vaccine that can be obtained at local pharmacy     FOLLOW-UP: 4 weeks to discuss and review test results and 8 weeks for PHYSICAL       After visit on 2/16/24 based on test results:    -Vit D deficiency: patient advised to please start over the counter Vitamin D3 2,000 units daily     -hypothyroidism,/low thyroid function that needs to be treated. Shanti advised that I recommend starting levothyroxine 75 mcg daily to treat hypothyroidism that was sent to his pharmacy. Please take daily in the morning on an empty stomach and do not eat for 30 minutes after taking  "medication. We will plan to repeat TSH levels in 1 month after you start levothyroxine, and adjust your medication  dosing accordingly.     -normal kidney function. Normal glucose and electrolytes     -elevated bilirubin (2.3) that needs to be evaluated by gastroenterology (GI). Patient advised that I have ordered  a referral to GI.      -urinalysis grossly normal, with no signs of UTI although shows 3+ bacteria but unclear if normal urogenital hunter. Added a urine culture from urine sample. Very low suspicion for a UTI given no symptoms     -prediabetes (HgBA1c 5.9); patient advised to please follow a low carbohydrate, low cholesterol, low fat diet, regularly exercise, and limit alcohol intake     -HIV, syphilis, Hep C negative     -Vit B12 levels normal     -PSA screening normal; next due in 1 year (2/2025)    -gonorrhea, chlamydia, trichomonas negative     -urine culture is negative for UTI     CT Heart Ca scoring (3/10/24):  FINDINGS:  The score and distribution of calcium in the coronary arteries is as  follows:      LM 0,  LAD 0,  LCx 0,  RCA 0,      Total   0      The visualized mid/lower ascending thoracic aorta, is normal in size,  measuring 34 mm in diameter. The heart is normal in size. No  pericardial effusion is present.Main pulmonary artery is normal in  caliber.      There is no evidence of lymphadenopathy or mass within the visualized  mediastinum.The visualized esophagus is unremarkable.      The visualized segments of the lungsare normally expanded.      The visualized subdiaphragmatic structures demonstrate no remarkable  findings.          IMPRESSION:  Coronary artery calcium score of 0          Admitted 1/25/24 and discharged 1/25/24 for RIGHT sided facial droop and weakness to r/o Bell's Balsy vs Stroke.  Per Hospital Discharge Summary 1/25/24:     \"Hospital Course  This is 59 years old male with no significant past medical history except high cholesterol.  Patient presented to [facility] " "with complaint of right facial numbness and drooping x 2 days .  Blood pressure was slightly high has to follow-up with his primary doctor as outpatient.  Seen  by neurology his right facial drooping is related to Bell's palsy.  Has to follow-up with neurology as outpatient.  Patient is hemodynamically stable.      Physical exam  Physical Exam  General: alert, no diaphoresis   HENT: Right-sided facial drooping . mucous membranes moist, external ears normal, no rhinorrhea   Eyes: no icterus or injection, no discharge   Lungs: CTA BL   Heart: RRR, no murmurs, no LE edema BL   GI: abdomen soft, nontender, nondistended, BS present   MSK: no joint effusion or deformity   Skin: no rashes, erythema, or ecchymosis   Neuro: grossly normal cognition, motor strength, sensation     Medication List      START taking these medications     atorvastatin 40 mg tablet; Commonly known as: Lipitor; Take 1 tablet (40   mg) by mouth once daily at bedtime.   white petrolatum-mineral oiL 94-3 % ophthalmic ointment; Commonly known   as: Tears Naturale PM; Apply 1 Application to both eyes once daily at   bedtime.        Outpatient Follow-Up  No future appointments.\"        Seen by Neurology during admission 1/25/24.  Per Neurology provider note:     Date of Service: 1/25/2024, Hospital Day: 1      Inpatient consult to Neurology  Consult performed by: Randall Becerra MD  Consult ordered by: Eliz Zhang MD  Reason for consult: facial weakness  Assessment/Recommendations:      Impressions: Right Bell's palsy - likely HSV related and not VZV. No trigger identified in this otherwise healthy man. At risk for stroke and in the slight possibility of stroke we had to rule it out. His neurological examination is completely normal except for cranial nerve VII and only on the right. He has a Bell's phenomenon but is at risk of developing a corneal drying/ulcer OD.      Recommendations/Plans: Establish a PCP with Dr. Monge/Izabel Live, ADY-ABDIRASHID " to lower stroke lists. TIA/stroke 911 precautions given. He is neurologically stable to go home tonight with Lacri-Lube nightly.  Steroids are optional at this point and antivirals are not helpful.  He was warned about the facial disfigurement and given the example of Chetan Ambrosio to ease him into the unfamiliarity and the long-term possibilities.  He was also warned about crocodile tears.  No neurological outpatient follow-up is necessary.        Inpatient consult to Neurology  Consult performed by: Randall Becerra MD  Consult ordered by: Mendez Alcantara PA-C        History Of Present Illness: Sohan is a 59-year-old right-handed man former smoker past medical history of hypertension and hyperlipidemia (untreated) who has a rather unusual story as follows: He just did not feel well last week whelping pain behind his right ear later moved into his ear and sought help from urgent care on 1/22/2024.  They said they could see something happening in that ear and gave him antibiotics and steroid tapering he did not start the steroids until Tuesday.  He says that he had an asymmetry of his face for a few days (very vague historian, seem anxious and overwhelmed) but it was the numbness in the right side of his face that sent him to the [facility] emergency room today from work.  Upon arrival blood pressure was elevated (159/97 mmHg).  CT head without IV contrast (1/25/2024, 1031) was normal.  The ED course neurological course is documented in my ED note and because of the possibility that he had a brainstem stroke instead he was kept for neuroimaging completed this afternoon.  MRI brain (1/25/2024) was unremarkable.  MRA brain (1/25/24) was also normal. He doesn't feel any different now than this morning - feel odd in his face, pointing to the left side of his face as different than his right. No pain. OD has decreased vision from childhood due to amblyopia but does not feel like a foreign object is in there no pain or  "redness. . Otological review of systems also normal.        Medical Decision Making:  Vascular US Carotid Artery Duplex Bilateral (1/25/2024): Less than 50% stenosis of the bilateral internal carotid arteries.       MR angio head wo IV contrast (1/25/2024): Within normal limits. I personally reviewed these images and concur with the radiological interpretation.       MR brain wo IV contrast (1/25/2024 2:29 pm): Within normal limits. I personally reviewed these images and concur with the radiological interpretation.       CT head wo IV contrast (1/25/2024): Unremarkably normal. I personally reviewed these images and concur with the radiological interpretation.  \"        PMHx:  -elevated BP==> was not starting on BP medications when admitted 1/25/24  -hyperlipidemia (, , ; 1/25/24)==> started on atorvastatin 40 mg daily  -Bell's palsy==> on white petrolatum-mineral oiL 94-3 % ophthalmic ointment; Commonly known as: Tears Naturale PM; Apply 1 Application to both eyes once daily at  bedtime to prevent eye ulceration  -elevated glucose 111; 1/25/24  -elevated bilirubin 1.6; 1.25/24     Healthcare Providers:  PCP: none  Neurology:  NONE==>  ORDERED REFERRAL to NEUROLOGY TODAY 2/16/24  Ophthalmology: NONE==> ORDERED REFERRAL to OPHTHALMOLOGY TODAY 2/16/24     Preventive Health Services:  -Last physical: 5+ years  -last PSA screening: ?  -last colonoscopy: per patient over 10 years ago==> NOW DUE  -last STI screening:?  -Hep C screening: ?     Immunizations:   -Childhood vaccines: completed per patient    -COVID vaccine and booster: DID NOT RECEIVE  -updated COVID spike vaccine: NOW DUE  -Flu vaccine: NOW DUE  -TDAP:   NOW DUE  -shingles: NOW DUE        Immunization History   Administered Date(s) Administered    Tdap vaccine, age 7 year and older (BOOSTRIX, ADACEL) 02/16/2024       INTERVAL HISTORY:        Today Sohan  reports:     - doing and feeling    -fully resolved right sided facial droop. He denies " any facial or ear pain, or facial numbness or tingling.      -taking all medications as prescribed with no reported adverse medication side effects     -has scheduled appts with: 1) neurology 24; 2) ophthalmology 24; 3) audiology 24      Today he has no other reported complaints, issues, or problems.     ROS is NEG for HEADACHE, NAUSEA, VOMITING, DIARRHEA, CHEST PAIN, SOB, and BLEEDING.  Review of systems (10+) negative for all systems except for any identified issues in HPI above.        Sexually active with wife.  Denies history STIs        SHx:  -lives with wife and daughter (22 yo)  -employment:   -tobacco use: EX-SMOKER (/ ppd x 15 years= 7.5 pack-years; quit )  -alcohol use: 2 beers every other other weeks  -illicits: DENIES        FHx:  Cancer:  -prostate cancer: father ()  HTN: mother  DM: DENIES  Heart Disease: DENIES  Stroke: DENIES  Thyroid Disease: DENIES    Objective     /72   Pulse 67   Temp 36.8 °C (98.2 °F)   Resp 18   Wt 75.9 kg (167 lb 6.4 oz)   SpO2 97%   BMI 22.70 kg/m²      Physical Examination:       GENERAL           General Appearance: well-appearing, well-developed, well-hydrated, well-nourished, no acute distress.        HEENT           NECK supple, no masses or thyromegaly, no carotid bruit.              FACE: RESOLVED RIGHT SIDED FACIAL DROOP        EYES           Extraocular Movements: normal, bilateral eyes LESLY, no conjunctival injection.        HEART           Rate and Rhythm regular rate and rhythm. Heart sounds: normal S1S2, no S3 or S4. Murmurs: none.        CHEST           Breath sounds: Clear to IPPA, RR<16 no use of accessory muscles.        ABDOMEN           General: Neg for LKKS or masses, no scleral icterus or jaundice.        MUSCULOSKELETAL           Joints Demonstration: Neg for erythema, swelling or joint deformities. gross abnormalities no gross abnormalities.        EXTREMITIES           Lower Extremities: Neg  for cyanosis, clubbing or edema.       Assessment/Plan   Problem List Items Addressed This Visit       Bell palsy     Other Visit Diagnoses       Acquired hypothyroidism    -  Primary    Colon cancer screening        Immunization counseling        Serum total bilirubin elevated        Vitamin D deficiency        Mixed hyperlipidemia        Prediabetes              Hypothyroidism: TSH 12.42 (2/17/24)  -TSH/T4 ordered; will adjust levothyroxine dosing accordingly.   -cont  levothyroxine 75 mcg daily in the morning on an empty stomach and do not eat for 30 minutes after taking medication.      elevated bilirubin (2.3)   -repeat CMP ordered  -previously ordered referral to GI.     Bell's Palsy mostly likely due to HSV (per inpatient neurology note 1/15/24):  -cont white petrolatum-mineral oiL 94-3 % ophthalmic ointment; Commonly known as: Tears Naturale PM; Apply 1 Application to both eyes once daily at  bedtime to prevent eye ulceration  -neurology referral ordered 2/16/24==> has scheduled appt  -ophthalmology referral ordered 2/16/24==> has scheduled appt  -advised patient to cont white petrolatum-mineral oiL 94-3 % ophthalmic ointment; Commonly known as: Tears Naturale PM; Apply 1 Application to both eyes once daily at  bedtime  until seen by ophthamology  -recommend shingles vaccine; patient would like to receive at follow-up visit  -emergency Dept and 911/EMS precautions discussed and reviewed     Hyperlipidemia  -cont atorvastatin 40 mg daily  -low fat,  low cholesterol diet, regularly exercise, and limit alcohol intake     Prediabetes: 5.9 (2/19/24)  -low carbohydrate, low cholesterol, low fat diet, regularly exercise, and limit alcohol intake   -HgBA1c next due  5/2024     Vitamin D deficiency  -cont over the counter Vitamin D3 2,000 units daily       Colon Cancer Screening: NOW DUE   -colonoscopy ordered 2/16/24==> cologuard testing ordered today since patient prefers over colonoscopy      Counseling:        Medication education:         Education:  The patient is counseled regarding potential side-effects of all new medications        Understanding:  Patient expressed understanding        Adherence:  No barriers to adherence identified        Immunizations Counseling  -flu vaccine and shingles vaccine now due==> shingles #1/2 received today  -recommend updated COVID spike vaccine that can be obtained at local pharmacy     FOLLOW-UP: 8 weeks for PHYSICAL     Discussed recommended plan of care with patient. Patient expressed understanding and agreement with plan of care. All of patient's questions were answered at the time. Patient had no additional questions at the time.         Jessy Alvarado MD, PhD

## 2024-03-15 ENCOUNTER — OFFICE VISIT (OUTPATIENT)
Dept: PRIMARY CARE | Facility: CLINIC | Age: 59
End: 2024-03-15
Payer: COMMERCIAL

## 2024-03-15 VITALS
DIASTOLIC BLOOD PRESSURE: 72 MMHG | BODY MASS INDEX: 22.7 KG/M2 | RESPIRATION RATE: 18 BRPM | HEART RATE: 67 BPM | TEMPERATURE: 98.2 F | WEIGHT: 167.4 LBS | OXYGEN SATURATION: 97 % | SYSTOLIC BLOOD PRESSURE: 118 MMHG

## 2024-03-15 DIAGNOSIS — G51.0 BELL PALSY: ICD-10-CM

## 2024-03-15 DIAGNOSIS — E03.9 ACQUIRED HYPOTHYROIDISM: Primary | ICD-10-CM

## 2024-03-15 DIAGNOSIS — Z12.11 COLON CANCER SCREENING: ICD-10-CM

## 2024-03-15 DIAGNOSIS — R73.03 PREDIABETES: ICD-10-CM

## 2024-03-15 DIAGNOSIS — R17 SERUM TOTAL BILIRUBIN ELEVATED: ICD-10-CM

## 2024-03-15 DIAGNOSIS — Z71.85 IMMUNIZATION COUNSELING: ICD-10-CM

## 2024-03-15 DIAGNOSIS — Z23 ENCOUNTER FOR ADMINISTRATION OF VACCINE: ICD-10-CM

## 2024-03-15 DIAGNOSIS — E55.9 VITAMIN D DEFICIENCY: ICD-10-CM

## 2024-03-15 DIAGNOSIS — E78.2 MIXED HYPERLIPIDEMIA: ICD-10-CM

## 2024-03-15 PROCEDURE — 90750 HZV VACC RECOMBINANT IM: CPT | Performed by: FAMILY MEDICINE

## 2024-03-15 PROCEDURE — 1036F TOBACCO NON-USER: CPT | Performed by: FAMILY MEDICINE

## 2024-03-15 PROCEDURE — 99214 OFFICE O/P EST MOD 30 MIN: CPT | Performed by: FAMILY MEDICINE

## 2024-03-15 RX ORDER — FLUTICASONE PROPIONATE 50 MCG
2 SPRAY, SUSPENSION (ML) NASAL EVERY 24 HOURS
COMMUNITY
Start: 2018-11-05

## 2024-03-15 RX ORDER — CALCITRIOL 0.25 UG/1
0.25 CAPSULE ORAL DAILY
COMMUNITY
End: 2024-05-10 | Stop reason: WASHOUT

## 2024-03-15 ASSESSMENT — ENCOUNTER SYMPTOMS
DEPRESSION: 0
LOSS OF SENSATION IN FEET: 0
OCCASIONAL FEELINGS OF UNSTEADINESS: 0

## 2024-03-15 ASSESSMENT — COLUMBIA-SUICIDE SEVERITY RATING SCALE - C-SSRS
2. HAVE YOU ACTUALLY HAD ANY THOUGHTS OF KILLING YOURSELF?: NO
1. IN THE PAST MONTH, HAVE YOU WISHED YOU WERE DEAD OR WISHED YOU COULD GO TO SLEEP AND NOT WAKE UP?: NO
6. HAVE YOU EVER DONE ANYTHING, STARTED TO DO ANYTHING, OR PREPARED TO DO ANYTHING TO END YOUR LIFE?: NO

## 2024-03-15 ASSESSMENT — PAIN SCALES - GENERAL: PAINLEVEL: 0-NO PAIN

## 2024-03-18 ENCOUNTER — LAB (OUTPATIENT)
Dept: LAB | Facility: LAB | Age: 59
End: 2024-03-18
Payer: COMMERCIAL

## 2024-03-18 ENCOUNTER — PHARMACY VISIT (OUTPATIENT)
Dept: PHARMACY | Facility: CLINIC | Age: 59
End: 2024-03-18
Payer: COMMERCIAL

## 2024-03-18 DIAGNOSIS — E03.9 ACQUIRED HYPOTHYROIDISM: ICD-10-CM

## 2024-03-18 DIAGNOSIS — R17 SERUM TOTAL BILIRUBIN ELEVATED: ICD-10-CM

## 2024-03-18 LAB
ALBUMIN SERPL-MCNC: 4.6 G/DL (ref 3.5–5)
ALP BLD-CCNC: 89 U/L (ref 35–125)
ALT SERPL-CCNC: 19 U/L (ref 5–40)
ANION GAP SERPL CALC-SCNC: 14 MMOL/L
AST SERPL-CCNC: 17 U/L (ref 5–40)
BILIRUB SERPL-MCNC: 1.5 MG/DL (ref 0.1–1.2)
BUN SERPL-MCNC: 12 MG/DL (ref 8–25)
CALCIUM SERPL-MCNC: 9.6 MG/DL (ref 8.5–10.4)
CHLORIDE SERPL-SCNC: 103 MMOL/L (ref 97–107)
CO2 SERPL-SCNC: 26 MMOL/L (ref 24–31)
CREAT SERPL-MCNC: 1 MG/DL (ref 0.4–1.6)
EGFRCR SERPLBLD CKD-EPI 2021: 87 ML/MIN/1.73M*2
GLUCOSE SERPL-MCNC: 109 MG/DL (ref 65–99)
POTASSIUM SERPL-SCNC: 4.2 MMOL/L (ref 3.4–5.1)
PROT SERPL-MCNC: 7.2 G/DL (ref 5.9–7.9)
SODIUM SERPL-SCNC: 143 MMOL/L (ref 133–145)
TSH SERPL DL<=0.05 MIU/L-ACNC: 4.12 MIU/L (ref 0.27–4.2)

## 2024-03-18 PROCEDURE — 36415 COLL VENOUS BLD VENIPUNCTURE: CPT

## 2024-03-18 PROCEDURE — RXMED WILLOW AMBULATORY MEDICATION CHARGE

## 2024-03-18 PROCEDURE — 84443 ASSAY THYROID STIM HORMONE: CPT

## 2024-03-18 PROCEDURE — 80053 COMPREHEN METABOLIC PANEL: CPT

## 2024-04-04 LAB — NONINV COLON CA DNA+OCC BLD SCRN STL QL: NEGATIVE

## 2024-04-18 ENCOUNTER — APPOINTMENT (OUTPATIENT)
Dept: OPHTHALMOLOGY | Facility: CLINIC | Age: 59
End: 2024-04-18
Payer: COMMERCIAL

## 2024-04-18 PROCEDURE — RXMED WILLOW AMBULATORY MEDICATION CHARGE

## 2024-04-19 ENCOUNTER — PHARMACY VISIT (OUTPATIENT)
Dept: PHARMACY | Facility: CLINIC | Age: 59
End: 2024-04-19
Payer: COMMERCIAL

## 2024-05-08 NOTE — PROGRESS NOTES
Subjective   Sohan Valdez is a 59 y.o. male who presents for FOLLOW UP TO DISCUSS and REVIEW TEST RESULTS INCLUDING ELEVATED BILIRUBIN..    HPI:      58 yo male EX-SMOKER (1/2 ppd x 15 years= 7.5 pack-years; quit 1999) presenting FOLLOW UP VISIT TO DISCUSS and REVIEW TEST RESULTS  INCLUDING ELEVATED BILIRUBIN.        EMR/[facility] records reviewed.      Last seen by me on 3/15/24 for FOLLOW UP VISIT TO DISCUSS and REVIEW TEST RESULTS.  At visit:    Hypothyroidism: TSH 12.42 (2/17/24)  -TSH/T4 ordered; will adjust levothyroxine dosing accordingly.   -cont  levothyroxine 75 mcg daily in the morning on an empty stomach and do not eat for 30 minutes after taking medication.        elevated bilirubin (2.3)   -repeat CMP ordered  -previously ordered referral to GI.      Bell's Palsy mostly likely due to HSV (per inpatient neurology note 1/15/24):  -cont white petrolatum-mineral oiL 94-3 % ophthalmic ointment; Commonly known as: Tears Naturale PM; Apply 1 Application to both eyes once daily at  bedtime to prevent eye ulceration  -neurology referral ordered 2/16/24==> has scheduled appt  -ophthalmology referral ordered 2/16/24==> has scheduled appt  -advised patient to cont white petrolatum-mineral oiL 94-3 % ophthalmic ointment; Commonly known as: Tears Naturale PM; Apply 1 Application to both eyes once daily at  bedtime  until seen by ophthamology  -recommend shingles vaccine; patient would like to receive at follow-up visit  -emergency Dept and 911/EMS precautions discussed and reviewed     Hyperlipidemia  -cont atorvastatin 40 mg daily  -low fat,  low cholesterol diet, regularly exercise, and limit alcohol intake     Prediabetes: 5.9 (2/19/24)  -low carbohydrate, low cholesterol, low fat diet, regularly exercise, and limit alcohol intake   -HgBA1c next due  5/2024     Vitamin D deficiency  -cont over the counter Vitamin D3 2,000 units daily       Colon Cancer Screening: NOW DUE   -colonoscopy ordered 2/16/24==> colognico  testing ordered today since patient prefers over colonoscopy         Immunizations Counseling  -flu vaccine and shingles vaccine now due==> shingles #1/2 received today  -recommend updated COVID spike vaccine that can be obtained at local pharmacy     FOLLOW-UP: 8 weeks for PHYSICAL        Also  seen by me on 2/16/24 as a NPV to and HOSPITAL FOLLOW-UP to ESTABLISH PCP CARE and for CARE GAP REVIEW. At visit:  -ORDERED LABS==> COMPLETED     Bell's Palsy mostly likely due to HSV (per inpatient neurology note 1/15/24):  -cont white petrolatum-mineral oiL 94-3 % ophthalmic ointment; Commonly known as: Tears Naturale PM; Apply 1 Application to both eyes once daily at  bedtime to prevent eye ulceration  -neurology referral ordered  -ophthalmology referral ordered  -advised patient to cont white petrolatum-mineral oiL 94-3 % ophthalmic ointment; Commonly known as: Tears Naturale PM; Apply 1 Application to both eyes once daily at  bedtime  until seen by ophthamology  -recommend shingles vaccine; patient would like to receive at follow-up visit  -emergency Dept and 911/EMS precautions discussed and reviewed      Colon Cancer Screening: NOW DUE   -colonoscopy ordered     Heart Disease Screening:  -CT Heart Ca scoring ordered==> COMPLETED     -flu vaccine, shingles vaccine, and TDAP now due==> RECEIVED TDAP TODAY 2/16/24  -recommend updated COVID spike vaccine that can be obtained at local pharmacy     FOLLOW-UP: 4 weeks to discuss and review test results and 8 weeks for PHYSICAL        After visit on 2/16/24 based on test results:     -Vit D deficiency: patient advised to please start over the counter Vitamin D3 2,000 units daily     -hypothyroidism,/low thyroid function that needs to be treated. Shanti advised that I recommend starting levothyroxine 75 mcg daily to treat hypothyroidism that was sent to his pharmacy. Please take daily in the morning on an empty stomach and do not eat for 30 minutes after taking medication. We  "will plan to repeat TSH levels in 1 month after you start levothyroxine, and adjust your medication  dosing accordingly.     -normal kidney function. Normal glucose and electrolytes     -elevated bilirubin (2.3) that needs to be evaluated by gastroenterology (GI). Patient advised that I have ordered  a referral to GI.      -urinalysis grossly normal, with no signs of UTI although shows 3+ bacteria but unclear if normal urogenital hunter. Added a urine culture from urine sample. Very low suspicion for a UTI given no symptoms     -prediabetes (HgBA1c 5.9); patient advised to please follow a low carbohydrate, low cholesterol, low fat diet, regularly exercise, and limit alcohol intake     -HIV, syphilis, Hep C negative     -Vit B12 levels normal     -PSA screening normal; next due in 1 year (2/2025)     -gonorrhea, chlamydia, trichomonas negative      -urine culture is negative for UTI      CT Heart Ca scoring (3/10/24):  FINDINGS:  The score and distribution of calcium in the coronary arteries is as  follows:      LM 0,  LAD 0,  LCx 0,  RCA 0,      Total   0      The visualized mid/lower ascending thoracic aorta, is normal in size,  measuring 34 mm in diameter. The heart is normal in size. No  pericardial effusion is present.Main pulmonary artery is normal in  caliber.      There is no evidence of lymphadenopathy or mass within the visualized  mediastinum.The visualized esophagus is unremarkable.      The visualized segments of the lungsare normally expanded.      The visualized subdiaphragmatic structures demonstrate no remarkable  findings.          IMPRESSION:  Coronary artery calcium score of 0            Admitted 1/25/24 and discharged 1/25/24 for RIGHT sided facial droop and weakness to r/o Bell's Balsy vs Stroke.  Per Hospital Discharge Summary 1/25/24:     \"Hospital Course  This is 59 years old male with no significant past medical history except high cholesterol.  Patient presented to [facility] with " "complaint of right facial numbness and drooping x 2 days .  Blood pressure was slightly high has to follow-up with his primary doctor as outpatient.  Seen  by neurology his right facial drooping is related to Bell's palsy.  Has to follow-up with neurology as outpatient.  Patient is hemodynamically stable.      Physical exam  Physical Exam  General: alert, no diaphoresis   HENT: Right-sided facial drooping . mucous membranes moist, external ears normal, no rhinorrhea   Eyes: no icterus or injection, no discharge   Lungs: CTA BL   Heart: RRR, no murmurs, no LE edema BL   GI: abdomen soft, nontender, nondistended, BS present   MSK: no joint effusion or deformity   Skin: no rashes, erythema, or ecchymosis   Neuro: grossly normal cognition, motor strength, sensation     Medication List      START taking these medications     atorvastatin 40 mg tablet; Commonly known as: Lipitor; Take 1 tablet (40   mg) by mouth once daily at bedtime.   white petrolatum-mineral oiL 94-3 % ophthalmic ointment; Commonly known   as: Tears Naturale PM; Apply 1 Application to both eyes once daily at   bedtime.        Outpatient Follow-Up  No future appointments.\"        Seen by Neurology during admission 1/25/24.  Per Neurology provider note:     Date of Service: 1/25/2024, Hospital Day: 1      Inpatient consult to Neurology  Consult performed by: Randall Becerra MD  Consult ordered by: Eliz Zhang MD  Reason for consult: facial weakness  Assessment/Recommendations:      Impressions: Right Bell's palsy - likely HSV related and not VZV. No trigger identified in this otherwise healthy man. At risk for stroke and in the slight possibility of stroke we had to rule it out. His neurological examination is completely normal except for cranial nerve VII and only on the right. He has a Bell's phenomenon but is at risk of developing a corneal drying/ulcer OD.      Recommendations/Plans: Establish a PCP with Dr. Monge/ADY Denis-ABDIRASHID to " lower stroke lists. TIA/stroke 911 precautions given. He is neurologically stable to go home tonight with Lacri-Lube nightly.  Steroids are optional at this point and antivirals are not helpful.  He was warned about the facial disfigurement and given the example of Chetan Ambrosio to ease him into the unfamiliarity and the long-term possibilities.  He was also warned about crocodile tears.  No neurological outpatient follow-up is necessary.        Inpatient consult to Neurology  Consult performed by: Randall Becerra MD  Consult ordered by: Mendez Alcantara PA-C        History Of Present Illness: Sohan is a 59-year-old right-handed man former smoker past medical history of hypertension and hyperlipidemia (untreated) who has a rather unusual story as follows: He just did not feel well last week whelping pain behind his right ear later moved into his ear and sought help from urgent care on 1/22/2024.  They said they could see something happening in that ear and gave him antibiotics and steroid tapering he did not start the steroids until Tuesday.  He says that he had an asymmetry of his face for a few days (very vague historian, seem anxious and overwhelmed) but it was the numbness in the right side of his face that sent him to the [facility] emergency room today from work.  Upon arrival blood pressure was elevated (159/97 mmHg).  CT head without IV contrast (1/25/2024, 1031) was normal.  The ED course neurological course is documented in my ED note and because of the possibility that he had a brainstem stroke instead he was kept for neuroimaging completed this afternoon.  MRI brain (1/25/2024) was unremarkable.  MRA brain (1/25/24) was also normal. He doesn't feel any different now than this morning - feel odd in his face, pointing to the left side of his face as different than his right. No pain. OD has decreased vision from childhood due to amblyopia but does not feel like a foreign object is in there no pain or redness. .  "Otological review of systems also normal.        Medical Decision Making:  Vascular US Carotid Artery Duplex Bilateral (1/25/2024): Less than 50% stenosis of the bilateral internal carotid arteries.       MR angio head wo IV contrast (1/25/2024): Within normal limits. I personally reviewed these images and concur with the radiological interpretation.       MR brain wo IV contrast (1/25/2024 2:29 pm): Within normal limits. I personally reviewed these images and concur with the radiological interpretation.       CT head wo IV contrast (1/25/2024): Unremarkably normal. I personally reviewed these images and concur with the radiological interpretation.  \"        PMHx:  -elevated BP==> was not starting on BP medications when admitted 1/25/24  -hyperlipidemia (, , ; 1/25/24)==> started on atorvastatin 40 mg daily  -Bell's palsy==> on white petrolatum-mineral oiL 94-3 % ophthalmic ointment; Commonly known as: Tears Naturale PM; Apply 1 Application to both eyes once daily at  bedtime to prevent eye ulceration  -elevated glucose 111; 1/25/24  -elevated bilirubin 1.6; 1.25/24     Healthcare Providers:  PCP: none  Neurology:  NONE==>  ORDERED REFERRAL to NEUROLOGY TODAY 2/16/24  Ophthalmology: NONE==> ORDERED REFERRAL to OPHTHALMOLOGY TODAY 2/16/24     Preventive Health Services:  -Last physical: 5+ years  -last PSA screening: ?  -last colonoscopy: per patient over 10 years ago==> NOW DUE  -last STI screening:?  -Hep C screening: ?     Immunizations:   -Childhood vaccines: completed per patient    -COVID vaccine and booster: DID NOT RECEIVE  -updated COVID spike vaccine: NOW DUE  -Flu vaccine: NOW DUE  -TDAP:   NOW DUE  -shingles: NOW DUE        Immunization History   Administered Date(s) Administered    Tdap vaccine, age 7 year and older (BOOSTRIX, ADACEL) 02/16/2024    Zoster vaccine, recombinant, adult (SHINGRIX) 03/15/2024             INTERVAL HISTORY:     -cologuard colon cancer screening (3/30/24) " negative: NEXT DUE 3/30/27      -completed labs ordered 3/15/24. Based on test results:    -elevated glucose (109)     -normal kidney function and electrolytes     -elevated bilirubin (1.5) that needs further evaluation by gastroenterology. Referral to gastroenterology ordered     -normal thyroid function, although borderline low function. Patient advised At follow-up visit we will discuss possibly increasing thyroid medication dose         Today Sohan reports:     - doing and feeling     -resolved right sided facial droop. He denies any facial or ear pain, or facial numbness or tingling.      -taking all medications as prescribed with no reported adverse medication side effects    -has not yet scheduled GI appt, but states that he will call to schedule.     -has scheduled appts with: 1) neurology 24; 2) ophthalmology 24; 3) audiology 24      Today he has no other reported complaints, issues, or problems.     ROS is NEG for HEADACHE, NAUSEA, VOMITING, DIARRHEA, CHEST PAIN, SOB, and BLEEDING.  Review of systems (10+) negative for all systems except for any identified issues in HPI above.        Sexually active with wife.  Denies history STIs        SHx:  -lives with wife and daughter (20 yo)  -employment:   -tobacco use: EX-SMOKER (1/2 ppd x 15 years= 7.5 pack-years; quit )  -alcohol use: 2 beers every other other weeks  -illicits: DENIES        FHx:  Cancer:  -prostate cancer: father ()  HTN: mother  DM: DENIES  Heart Disease: DENIES  Stroke: DENIES  Thyroid Disease: DENIES      Objective     /82   Pulse 77   Temp 36.8 °C (98.2 °F)   Wt 75.8 kg (167 lb)   SpO2 97%   BMI 22.65 kg/m²      Physical Examination:       GENERAL           General Appearance: well-appearing, well-developed, well-hydrated, well-nourished, no acute distress.        HEENT           NECK supple, no masses or thyromegaly, no carotid bruit.           FACE: RESOLVED RIGHT SIDED FACIAL DROOP         EYES           Extraocular Movements: normal, bilateral eyes LESLY, no conjunctival injection.        HEART           Rate and Rhythm regular rate and rhythm. Heart sounds: normal S1S2, no S3 or S4. Murmurs: none.        CHEST           Breath sounds: Clear to IPPA, RR<16 no use of accessory muscles.        ABDOMEN           General: Neg for LKKS or masses, no scleral icterus or jaundice.        MUSCULOSKELETAL           Joints Demonstration: Neg for erythema, swelling or joint deformities. gross abnormalities no gross abnormalities.        EXTREMITIES           Lower Extremities: Neg for cyanosis, clubbing or edema.       Assessment/Plan   Problem List Items Addressed This Visit       Bell palsy     Other Visit Diagnoses       Prediabetes    -  Primary    Acquired hypothyroidism        Immunization counseling        Serum total bilirubin elevated        Vitamin D deficiency        Mixed hyperlipidemia        Primary hypertension        Encounter for administration of vaccine              Hypothyroidism: TSH 4.12 (3/18/24)< 12.42 (2/17/24)  -cont  levothyroxine 75 mcg daily in the morning on an empty stomach and do not eat for 30 minutes after taking medication.  -recheck TSH in 8 weeks at follow up appt     Persistently elevated bilirubin : 1.5 (3/18/24)< 2.3  -previously ordered referral to GI==> RE-ORDERED GI REFERRAL TODAY      Bell's Palsy mostly likely due to HSV (per inpatient neurology note 1/15/24):  -cont white petrolatum-mineral oiL 94-3 % ophthalmic ointment; Commonly known as: Tears Naturale PM; Apply 1 Application to both eyes once daily at  bedtime to prevent eye ulceration  -neurology referral ordered 2/16/24==> has scheduled appt  -ophthalmology referral ordered 2/16/24==> has scheduled appt  -advised patient to cont white petrolatum-mineral oiL 94-3 % ophthalmic ointment; Commonly known as: Tears Naturale PM; Apply 1 Application to both eyes once daily at  bedtime  until seen by  ophthamology  -recommend shingles vaccine; patient would like to receive at follow-up visit  -emergency Dept and 911/EMS precautions discussed and reviewed     Hyperlipidemia  -cont atorvastatin 40 mg daily  -low fat,  low cholesterol diet, regularly exercise, and limit alcohol intake     Prediabetes: TODAY  5.5 (5/10/24)<  5.9 (2/19/24)  -POCT HGBA1c TODAY : 5.5  -low carbohydrate, low cholesterol, low fat diet, regularly exercise, and limit alcohol intake   -HgBA1c next due  8/9/2024     Vitamin D deficiency  -cont over the counter Vitamin D3 2,000 units daily       Colon Cancer Screening: COLOGUARD negative 3/30/24==> NEXT cologuard due 3/30/27         Counseling:       Medication education:         Education:  The patient is counseled regarding potential side-effects of all new medications        Understanding:  Patient expressed understanding        Adherence:  No barriers to adherence identified        Immunizations Counseling  -flu vaccine and shingles vaccine #2  now due==> shingles #2/2 received today  -recommend updated COVID spike vaccine that can be obtained at local pharmacy     FOLLOW-UP: 8 weeks for PHYSICAL    Discussed recommended plan of care with patient. Patient expressed understanding and agreement with plan of care. All of patient's questions were answered at the time. Patient had no additional questions at the time.        Jessy Alvarado MD, PhD

## 2024-05-08 NOTE — PATIENT INSTRUCTIONS
It was nice seeing you today.     Please continue to take all medications as prescribed,  including levothyroxine 75 mcg daily in the morning and your eye drops until you see the ophthalmologist, this is very important for your health.         Please call referral line to schedule appointments with GI for elevated bilirubin     I recommend receiving the shingles vaccine #2 today; you received this today     I recommend the updated COVID vaccine that can be obtained at your local pharmacy     Please schedule a follow up with me in 8 weeks for a physical

## 2024-05-10 ENCOUNTER — OFFICE VISIT (OUTPATIENT)
Dept: PRIMARY CARE | Facility: CLINIC | Age: 59
End: 2024-05-10
Payer: COMMERCIAL

## 2024-05-10 VITALS
WEIGHT: 167 LBS | TEMPERATURE: 98.2 F | HEART RATE: 77 BPM | BODY MASS INDEX: 22.65 KG/M2 | DIASTOLIC BLOOD PRESSURE: 82 MMHG | SYSTOLIC BLOOD PRESSURE: 124 MMHG | OXYGEN SATURATION: 97 %

## 2024-05-10 DIAGNOSIS — Z71.85 IMMUNIZATION COUNSELING: ICD-10-CM

## 2024-05-10 DIAGNOSIS — R17 SERUM TOTAL BILIRUBIN ELEVATED: ICD-10-CM

## 2024-05-10 DIAGNOSIS — E78.2 MIXED HYPERLIPIDEMIA: ICD-10-CM

## 2024-05-10 DIAGNOSIS — R73.03 PREDIABETES: Primary | ICD-10-CM

## 2024-05-10 DIAGNOSIS — E03.9 ACQUIRED HYPOTHYROIDISM: ICD-10-CM

## 2024-05-10 DIAGNOSIS — E55.9 VITAMIN D DEFICIENCY: ICD-10-CM

## 2024-05-10 DIAGNOSIS — Z23 ENCOUNTER FOR ADMINISTRATION OF VACCINE: ICD-10-CM

## 2024-05-10 DIAGNOSIS — I10 PRIMARY HYPERTENSION: ICD-10-CM

## 2024-05-10 DIAGNOSIS — G51.0 BELL PALSY: ICD-10-CM

## 2024-05-10 LAB — POC HEMOGLOBIN A1C: 5.5 % (ref 4.2–6.5)

## 2024-05-10 PROCEDURE — 3074F SYST BP LT 130 MM HG: CPT | Performed by: FAMILY MEDICINE

## 2024-05-10 PROCEDURE — 90471 IMMUNIZATION ADMIN: CPT | Performed by: FAMILY MEDICINE

## 2024-05-10 PROCEDURE — 90750 HZV VACC RECOMBINANT IM: CPT | Performed by: FAMILY MEDICINE

## 2024-05-10 PROCEDURE — 1036F TOBACCO NON-USER: CPT | Performed by: FAMILY MEDICINE

## 2024-05-10 PROCEDURE — 99214 OFFICE O/P EST MOD 30 MIN: CPT | Performed by: FAMILY MEDICINE

## 2024-05-10 PROCEDURE — 83036 HEMOGLOBIN GLYCOSYLATED A1C: CPT | Performed by: FAMILY MEDICINE

## 2024-05-10 PROCEDURE — 3079F DIAST BP 80-89 MM HG: CPT | Performed by: FAMILY MEDICINE

## 2024-05-10 ASSESSMENT — PAIN SCALES - GENERAL: PAINLEVEL: 0-NO PAIN

## 2024-05-10 ASSESSMENT — PATIENT HEALTH QUESTIONNAIRE - PHQ9
1. LITTLE INTEREST OR PLEASURE IN DOING THINGS: NOT AT ALL
SUM OF ALL RESPONSES TO PHQ9 QUESTIONS 1 AND 2: 0
2. FEELING DOWN, DEPRESSED OR HOPELESS: NOT AT ALL

## 2024-05-17 PROCEDURE — RXMED WILLOW AMBULATORY MEDICATION CHARGE

## 2024-05-20 ENCOUNTER — PHARMACY VISIT (OUTPATIENT)
Dept: PHARMACY | Facility: CLINIC | Age: 59
End: 2024-05-20
Payer: COMMERCIAL

## 2024-06-13 ENCOUNTER — OFFICE VISIT (OUTPATIENT)
Dept: GASTROENTEROLOGY | Facility: HOSPITAL | Age: 59
End: 2024-06-13
Payer: COMMERCIAL

## 2024-06-13 ENCOUNTER — LAB (OUTPATIENT)
Dept: LAB | Facility: LAB | Age: 59
End: 2024-06-13
Payer: COMMERCIAL

## 2024-06-13 VITALS
DIASTOLIC BLOOD PRESSURE: 87 MMHG | OXYGEN SATURATION: 96 % | TEMPERATURE: 97.4 F | HEART RATE: 69 BPM | SYSTOLIC BLOOD PRESSURE: 139 MMHG | BODY MASS INDEX: 21.84 KG/M2 | WEIGHT: 161 LBS

## 2024-06-13 DIAGNOSIS — R17 SERUM TOTAL BILIRUBIN ELEVATED: ICD-10-CM

## 2024-06-13 LAB
ALBUMIN SERPL-MCNC: 4.7 G/DL (ref 3.5–5)
ALP BLD-CCNC: 94 U/L (ref 35–125)
ALT SERPL-CCNC: 22 U/L (ref 5–40)
AST SERPL-CCNC: 23 U/L (ref 5–40)
BILIRUB DIRECT SERPL-MCNC: 0.3 MG/DL (ref 0–0.2)
BILIRUB SERPL-MCNC: 1.7 MG/DL (ref 0.1–1.2)
PROT SERPL-MCNC: 7.5 G/DL (ref 5.9–7.9)

## 2024-06-13 PROCEDURE — 36415 COLL VENOUS BLD VENIPUNCTURE: CPT

## 2024-06-13 PROCEDURE — 1036F TOBACCO NON-USER: CPT | Performed by: INTERNAL MEDICINE

## 2024-06-13 PROCEDURE — 99213 OFFICE O/P EST LOW 20 MIN: CPT | Performed by: INTERNAL MEDICINE

## 2024-06-13 PROCEDURE — 80076 HEPATIC FUNCTION PANEL: CPT

## 2024-06-13 PROCEDURE — 99203 OFFICE O/P NEW LOW 30 MIN: CPT | Performed by: INTERNAL MEDICINE

## 2024-06-13 SDOH — ECONOMIC STABILITY: FOOD INSECURITY: WITHIN THE PAST 12 MONTHS, YOU WORRIED THAT YOUR FOOD WOULD RUN OUT BEFORE YOU GOT MONEY TO BUY MORE.: NEVER TRUE

## 2024-06-13 SDOH — ECONOMIC STABILITY: FOOD INSECURITY: WITHIN THE PAST 12 MONTHS, THE FOOD YOU BOUGHT JUST DIDN'T LAST AND YOU DIDN'T HAVE MONEY TO GET MORE.: NEVER TRUE

## 2024-06-13 ASSESSMENT — ENCOUNTER SYMPTOMS
CHILLS: 0
CONFUSION: 0
COLOR CHANGE: 0
TROUBLE SWALLOWING: 0
WEAKNESS: 0
LIGHT-HEADEDNESS: 0
SHORTNESS OF BREATH: 0
FEVER: 0
BRUISES/BLEEDS EASILY: 0
ROS GI COMMENTS: SEE HPI
UNEXPECTED WEIGHT CHANGE: 0
EYE REDNESS: 0
COUGH: 0

## 2024-06-13 ASSESSMENT — LIFESTYLE VARIABLES
HOW MANY STANDARD DRINKS CONTAINING ALCOHOL DO YOU HAVE ON A TYPICAL DAY: 1 OR 2
HOW OFTEN DO YOU HAVE A DRINK CONTAINING ALCOHOL: MONTHLY OR LESS
SKIP TO QUESTIONS 9-10: 1
HOW OFTEN DO YOU HAVE SIX OR MORE DRINKS ON ONE OCCASION: NEVER
AUDIT-C TOTAL SCORE: 1

## 2024-06-13 ASSESSMENT — PATIENT HEALTH QUESTIONNAIRE - PHQ9
SUM OF ALL RESPONSES TO PHQ9 QUESTIONS 1 & 2: 0
1. LITTLE INTEREST OR PLEASURE IN DOING THINGS: NOT AT ALL
2. FEELING DOWN, DEPRESSED OR HOPELESS: NOT AT ALL

## 2024-06-13 ASSESSMENT — PAIN SCALES - GENERAL: PAINLEVEL: 0-NO PAIN

## 2024-06-13 NOTE — PROGRESS NOTES
Subjective     History of Present Illness:   Sohan Valdez is a 59 y.o. male with dyslipidemia, Bell’s palsy, and hypothyroidism who presented for consultation for elevated total bilirubinemia.  Patient was found to have elevated Tbili on routine blood work.  Patient denies nausea, vomiting, abdominal pain, or change in bowel habits.  Patient denies having any history of liver disease.  Patient denies prior upper endoscopy.  Recent cologuard was negative.  Patient denies prior abdominal surgery.  No new medications recently.    Review of Systems  Review of Systems   Constitutional:  Negative for chills, fever and unexpected weight change.   HENT:  Negative for trouble swallowing.    Eyes:  Negative for redness.   Respiratory:  Negative for cough and shortness of breath.    Cardiovascular:  Negative for chest pain.   Gastrointestinal:         See HPI   Skin:  Negative for color change.   Neurological:  Negative for weakness and light-headedness.   Hematological:  Does not bruise/bleed easily.   Psychiatric/Behavioral:  Negative for confusion.    All other systems reviewed and are negative.      Past Medical History   has a past medical history of High cholesterol and Right-sided Bell's palsy (01/25/2024).    He has no past medical history of Diabetes mellitus (Multi) or Hypertension.     Social History   reports that he quit smoking about 25 years ago. His smoking use included cigarettes. He started smoking about 40 years ago. He has a 7.5 pack-year smoking history. He has been exposed to tobacco smoke. He has never used smokeless tobacco. He reports current alcohol use of about 1.0 standard drink of alcohol per week. He reports that he does not use drugs.     Family History  family history includes Prostate cancer (age of onset: 83) in his father; htn in his mother.     Allergies  No Known Allergies    Medications  Current Outpatient Medications   Medication Instructions    atorvastatin (LIPITOR) 40 mg, oral,  "Nightly    fluticasone (Flonase) 50 mcg/actuation nasal spray 2 sprays, Each Nostril, Daily    levothyroxine (SYNTHROID) 75 mcg, oral, Daily before breakfast        Objective   Visit Vitals  /87   Pulse 69   Temp 36.3 °C (97.4 °F)      Body mass index is 21.84 kg/m².  Physical Exam  Constitutional:       General: He is not in acute distress.     Appearance: Normal appearance.   HENT:      Head: Normocephalic and atraumatic.      Mouth/Throat:      Mouth: Mucous membranes are moist.   Eyes:      General: No scleral icterus.  Cardiovascular:      Rate and Rhythm: Normal rate and regular rhythm.      Heart sounds: No murmur heard.  Pulmonary:      Effort: Pulmonary effort is normal.      Breath sounds: Normal breath sounds.   Abdominal:      General: Bowel sounds are normal.      Palpations: Abdomen is soft. There is no mass.      Tenderness: There is no abdominal tenderness. There is no guarding or rebound.   Musculoskeletal:         General: No swelling.      Cervical back: Neck supple.   Skin:     General: Skin is warm.      Coloration: Skin is not jaundiced.   Neurological:      Mental Status: He is alert and oriented to person, place, and time.   Psychiatric:         Mood and Affect: Mood normal.         Labs  Lab Results   Component Value Date    WBC 5.6 01/25/2024    HGB 14.5 01/25/2024    HCT 42.8 01/25/2024    MCV 88 01/25/2024     01/25/2024     Lab Results   Component Value Date    GLUCOSE 109 (H) 03/18/2024    CALCIUM 9.6 03/18/2024     03/18/2024    K 4.2 03/18/2024    CO2 26 03/18/2024     03/18/2024    BUN 12 03/18/2024    CREATININE 1.00 03/18/2024     Lab Results   Component Value Date    ALT 19 03/18/2024    AST 17 03/18/2024    ALKPHOS 89 03/18/2024    BILITOT 1.5 (H) 03/18/2024     No results found for: \"INR\"    Assessment/Plan   Sohan Valdez is a 59 y.o. male with dyslipidemia, Bell’s palsy, and hypothyroidism who presented for consultation for elevated total " bilirubinemia.  Patient has no GI symptoms and no risk factors for liver disease.  We will repeat liver enzymes including a direct bilirubin level today.  If his hyperbilirubinemia is coming from indirect hyperbilirubinemia, the likely cause would be a harmless condition called Gilbert syndrome which does need further follow-up.  If he has direct hyperbilirubinemia, we will perform further liver evaluation and abdominal ultrasound.    Delmar Ko MD

## 2024-06-13 NOTE — PATIENT INSTRUCTIONS
Repeat hepatic function panel and check direct bilirubin level.  If his hyperbilirubinemia is coming from indirect hyperbilirubinemia, the likely cause would be a harmless condition called Gilbert syndrome which does need further management.  If he has direct hyperbilirubinemia, we will perform further liver evaluation and abdominal ultrasound.  Follow-up as needed.

## 2024-06-14 ENCOUNTER — APPOINTMENT (OUTPATIENT)
Dept: PRIMARY CARE | Facility: CLINIC | Age: 59
End: 2024-06-14
Payer: COMMERCIAL

## 2024-06-20 PROCEDURE — RXMED WILLOW AMBULATORY MEDICATION CHARGE

## 2024-06-21 ENCOUNTER — PHARMACY VISIT (OUTPATIENT)
Dept: PHARMACY | Facility: CLINIC | Age: 59
End: 2024-06-21
Payer: COMMERCIAL

## 2024-06-23 ASSESSMENT — EXTERNAL EXAM - LEFT EYE: OS_EXAM: NORMAL

## 2024-06-23 ASSESSMENT — SLIT LAMP EXAM - LIDS: COMMENTS: GOOD POSITION

## 2024-06-23 NOTE — PROGRESS NOTES
Combined form of age-related cataract, right eyeH25.811  Combined form of age-related cataract, left eyeH25.812  -Not visually significant at this time. Monitor.     History of Bell's Palsy  -January 2024 - had ear infection, started on antibiotics and possible with steroids  -MRI brain wo IV contrast and MR angio head wo IV contrast (1/25/24) - WNL.   -Symptoms fully resolved within weeks of onset, used OTC gtts and ointment.   -Monitor. Cornea intact.    Posterior vitreous detachment, bilateral  -No retinal tear or detachment seen on exam. Retinal detachment symptoms discussed.     Amblyopia, right eye  Hyperopia  Astigmatism  Presbyopia  -New Rx given, recommend full time polycarbonate wear for eye protection.         No history of intraocular surgery/refractive surgery.   No FH of AMD/glaucoma

## 2024-06-24 ENCOUNTER — CLINICAL SUPPORT (OUTPATIENT)
Dept: AUDIOLOGY | Facility: CLINIC | Age: 59
End: 2024-06-24
Payer: COMMERCIAL

## 2024-06-24 ENCOUNTER — OFFICE VISIT (OUTPATIENT)
Dept: NEUROLOGY | Facility: CLINIC | Age: 59
End: 2024-06-24
Payer: COMMERCIAL

## 2024-06-24 VITALS
BODY MASS INDEX: 22.24 KG/M2 | DIASTOLIC BLOOD PRESSURE: 75 MMHG | HEART RATE: 68 BPM | RESPIRATION RATE: 18 BRPM | WEIGHT: 164 LBS | SYSTOLIC BLOOD PRESSURE: 117 MMHG

## 2024-06-24 DIAGNOSIS — G51.0 BELL PALSY: Primary | ICD-10-CM

## 2024-06-24 DIAGNOSIS — H90.3 SENSORINEURAL HEARING LOSS (SNHL) OF BOTH EARS: Primary | ICD-10-CM

## 2024-06-24 PROCEDURE — 99203 OFFICE O/P NEW LOW 30 MIN: CPT | Performed by: STUDENT IN AN ORGANIZED HEALTH CARE EDUCATION/TRAINING PROGRAM

## 2024-06-24 PROCEDURE — 99213 OFFICE O/P EST LOW 20 MIN: CPT | Performed by: STUDENT IN AN ORGANIZED HEALTH CARE EDUCATION/TRAINING PROGRAM

## 2024-06-24 PROCEDURE — 1036F TOBACCO NON-USER: CPT | Performed by: STUDENT IN AN ORGANIZED HEALTH CARE EDUCATION/TRAINING PROGRAM

## 2024-06-24 PROCEDURE — 92550 TYMPANOMETRY & REFLEX THRESH: CPT | Performed by: AUDIOLOGIST

## 2024-06-24 PROCEDURE — 92557 COMPREHENSIVE HEARING TEST: CPT | Performed by: AUDIOLOGIST

## 2024-06-24 ASSESSMENT — PAIN SCALES - GENERAL: PAINLEVEL: 0-NO PAIN

## 2024-06-24 NOTE — PROGRESS NOTES
Name: Sohan Valdez  YOB: 1965  Age: 59 y.o.    Date of Evaluation:  6/24/2024      History:      Patient presents today for hearing test in conjunction with Neurology visit.  Patient reports instance of right sided Bell's Palsy from which he has since recovered.    Denies significant hearing loss, tinnitus, vertigo, falls. Reports some occupational noise exposure.    Evaluation:    Otoscopy revealed clear ear canals bilaterally.    Immittance testing revealed normal middle ear function bilaterally.  Normal hearing sensitivity 250-1000 Hz sloping to a mild to moderately severe sensorineural hearing loss with good word discrimination (92%) bilaterally.        Treatment Plan:    - Follow up with Neurology as scheduled  - Amplification pending medical clearance, if patient perception changes  - Retest hearing in one year  - Hearing protection      [id number]    Soledad Day, CCC-A

## 2024-06-24 NOTE — PROGRESS NOTES
Neuromuscular Clinic Initial Visit     Date of Service: 6/24/2024  Patient: Sohan Valdez  MRN: [id number]  Referring Provider: Jessy Alvarado MD PhD  Primary Care Physician: Jessy Alvarado MD PhD     History:   History of Presenting Illness:  Sohan Valdez is a 59-year-old, right handed,  man with past medical history significant for hypothyroidism and hyperlipidemia who developed right Bell's palsy in January this year and presented today to the  neuromuscular clinic for neurological follow-up after an ED visit at that time.      Patient reports that in January he developed a right ear infection for which he went to urgent care.  He got some treatment from urgent care and in about 2 to 3 days he developed right mouth drooping, difficulty closing the right eye, difficulty chewing and keeping food and liquids in the mouth because of the right droop, hearing abnormalities in the right ear and loss of taste.  He was concerned about a stroke so he presented to the ED where he was evaluated by neurology.  He had an MRI brain and MRA done that where unremarkable (personally reviewed today).  Patient was not treated with a steroid or antiviral.  He reports that his symptoms gradually subsided within 2 to 3 weeks of the incident and now he is completely back to his normal state except for slightly tilted to smile.  He recently had hearing test that was normal as well.    Review of Systems:  All systems reviewed, negative except as stated above in HPI.    Past Medical History:  Past Medical History:   Diagnosis Date    High cholesterol     Right-sided Bell's palsy 01/25/2024       Past Surgical History:  No past surgical history on file.    Social History:  - Living situation: Lives with his wife and daughter  - Baseline function: Independent in ADLs and IADLs, ambulates without assistance  - Occupation:    - Tobacco use: Quit more than 25 years ago  - Alcohol use: Social drinking  - Illicit  drug use: denies      Family History:  Family History   Problem Relation Name Age of Onset    Other (htn) Mother      Prostate cancer Father  83       Medications:     Current Outpatient Medications:     atorvastatin (Lipitor) 40 mg tablet, Take 1 tablet (40 mg) by mouth once daily at bedtime., Disp: 30 tablet, Rfl: 11    fluticasone (Flonase) 50 mcg/actuation nasal spray, Administer 2 sprays into each nostril once daily., Disp: , Rfl:     levothyroxine (Synthroid) 75 mcg tablet, Take 1 tablet (75 mcg) by mouth once daily in the morning. Take before meals., Disp: 30 tablet, Rfl: 11    Allergies:   Patient has no known allergies.      Physical Exam:   Vital signs:  /75 (BP Location: Left arm, Patient Position: Sitting, BP Cuff Size: Adult)   Pulse 68   Resp 18   Wt 74.4 kg (164 lb)   BMI 22.24 kg/m²     Neurological Exam:  MENTAL STATUS:  Awake, alert, cooperative and engaging.  Follows commands.     CRANIAL NERVES:  - II/III: pupils are  equal and reactive to light   - II:  Visual fields intact to confrontation bilaterally  - III, IV, VI: EOM movement intact in all directions without diplopia or nystagmus  - V: V1-V3 sensation intact and symmetrical bilaterally  - VII: Intact eye closure bilaterally, slightly tilted this mild to the right (barely noticeable). Able to puff up cheeks with good seal.   - VIII: Intact to conversation and finger rub bilaterally  - IX, X: Palate elevated symmetrically bilaterally, no hoarseness or dysarthria   - XI: 5/5 strength on shoulder shrugging bilaterally  - XII: Tongue midline without atrophy or fasciculation. Strength is 5/5 bilaterally with normal bulk.    MOTOR:   Tone: normal in all four extremities   Bulk: normal throughout   Movement: no tremors or fasciculations noted    Strength:  Neck FLexion: 5/5  Neck Extension: 5/5     R             L   5             5             Shoulder abduction    5             5             Shoulder adduction               5              5             Elbow flexion           5             5             Elbow extension      5             5             Wrist flexion             5             5             Wrist extension        5             5             Finger flexion          5             5             Finger extension           5             5             Hip flexion                      5             5             Knee flexion            5             5             Knee extension        5             5             Ankle dorsiflexion    5             5             Ankle plantarflexion                5             5             Eversion   5             5             Inversion                  Deep Tendon Reflexes:   R             L                              2             2             Biceps       2             2             Brachioradialis          2             2             Triceps     2             2             Patellar      2             2             Achilles       COORDINATION: Finger to noseintact bilaterally.  SENSORY: Intact and symmetric to light touch in BUE and BLE  GAIT: Normal narrow-based stance. Narrow-based standard gait with normal arms swing.     Results:   The following labs, imaging, and results were personally reviewed.     Imaging:  MR brain wo IV contrast 01/25/2024    Impression  No acute intracranial findings. MRI brain is within normal limits.    Signed by: Danielito Lara 1/25/2024 3:42 PM  Dictation workstation:   [id number]    Provider Impression and plan:     Sohan Valdez is a 59-year-old healthy man who developed right Bell's palsy shortly after right ear infection in January.  His Bell's palsy is likely viral in origin given the ear infection.  He presented to the ED at that time where he had full workup done including MRI brain with MRA that we reviewed today; unremarkable.  He was not treated with steroid or antiviral.  His symptoms significantly improved since the onset and now he only has mild right  mouth weakness on neuroexam which is the recovery expected for mild Bell's palsy.  As patient is doing very well with minimal to no symptoms, no further neurology follow-up is needed.    Marisel Blount MD  Neuromuscular Fellow, PGY 5  [facility]     Attending Addendum:     I saw and evaluated the patient. I personally obtained the key and critical portions of the history and physical exam or was physically present for key and critical portions performed by the resident/fellow. I reviewed the resident/fellow's documentation and discussed the patient with the resident/fellow. I agree with the resident/fellow's medical decision making as documented in the note.    Pk Muhammad MD     I personally spent 30 minutes on the day of the visit completing the review of the medical record and outside records, obtaining history and performing an appropriate physical exam, patient care, counseling and education, placing orders, independently reviewing results, communicating with the patient/family and other providers, coordinating care and performing appropriate clinical documentation.      Problem List Items Addressed This Visit          Neuro    Bell palsy       No orders of the defined types were placed in this encounter.       Reviewed and approved by MARISEL BLOUNT on 6/24/24 at 3:57 PM.

## 2024-06-25 ENCOUNTER — APPOINTMENT (OUTPATIENT)
Dept: OPHTHALMOLOGY | Facility: CLINIC | Age: 59
End: 2024-06-25
Payer: COMMERCIAL

## 2024-06-25 DIAGNOSIS — H25.811 COMBINED FORM OF AGE-RELATED CATARACT, RIGHT EYE: Primary | ICD-10-CM

## 2024-06-25 DIAGNOSIS — H52.203 ASTIGMATISM OF BOTH EYES, UNSPECIFIED TYPE: ICD-10-CM

## 2024-06-25 DIAGNOSIS — H43.813 PVD (POSTERIOR VITREOUS DETACHMENT), BOTH EYES: ICD-10-CM

## 2024-06-25 DIAGNOSIS — G51.0 BELL PALSY: ICD-10-CM

## 2024-06-25 DIAGNOSIS — H52.01 HYPEROPIA OF RIGHT EYE: ICD-10-CM

## 2024-06-25 DIAGNOSIS — H53.001 AMBLYOPIA OF RIGHT EYE: ICD-10-CM

## 2024-06-25 DIAGNOSIS — H52.4 PRESBYOPIA: ICD-10-CM

## 2024-06-25 DIAGNOSIS — H25.812 COMBINED FORM OF AGE-RELATED CATARACT, LEFT EYE: ICD-10-CM

## 2024-06-25 PROCEDURE — 92004 COMPRE OPH EXAM NEW PT 1/>: CPT | Performed by: OPHTHALMOLOGY

## 2024-06-25 PROCEDURE — 1036F TOBACCO NON-USER: CPT | Performed by: OPHTHALMOLOGY

## 2024-06-25 PROCEDURE — 92015 DETERMINE REFRACTIVE STATE: CPT | Performed by: OPHTHALMOLOGY

## 2024-06-25 ASSESSMENT — REFRACTION_MANIFEST
OD_AXIS: 100
OD_ADD: +2.50
OS_CYLINDER: -1.25
OS_AXIS: 080
OS_CYLINDER: -1.25
OD_CYLINDER: -5.00
OS_SPHERE: +0.25
OS_AXIS: 078
OD_SPHERE: +2.00
OD_SPHERE: +2.00
OS_SPHERE: +0.25
OD_CYLINDER: -5.00
METHOD_AUTOREFRACTION: 1
OD_AXIS: 100
OS_ADD: +2.50

## 2024-06-25 ASSESSMENT — VISUAL ACUITY
OS_SC: 20/25
OD_SC: CF@1FT
METHOD: SNELLEN - LINEAR

## 2024-06-25 ASSESSMENT — ENCOUNTER SYMPTOMS
HEMATOLOGIC/LYMPHATIC NEGATIVE: 0
RESPIRATORY NEGATIVE: 0
ENDOCRINE NEGATIVE: 0
EYES NEGATIVE: 1
ALLERGIC/IMMUNOLOGIC NEGATIVE: 0
PSYCHIATRIC NEGATIVE: 0
GASTROINTESTINAL NEGATIVE: 0
NEUROLOGICAL NEGATIVE: 0
MUSCULOSKELETAL NEGATIVE: 0
CARDIOVASCULAR NEGATIVE: 0
CONSTITUTIONAL NEGATIVE: 0

## 2024-06-25 ASSESSMENT — TONOMETRY
OD_IOP_MMHG: 12
OS_IOP_MMHG: 13
IOP_METHOD: GOLDMANN APPLANATION

## 2024-06-25 ASSESSMENT — CUP TO DISC RATIO
OS_RATIO: 0.3
OD_RATIO: 0.3

## 2024-07-04 NOTE — PROGRESS NOTES
Subjective   Sohan Valdez is a 59 y.o. male who presents for FOLLOW UP VISIT FOR ELEVATED BILIRUBIN. Follow-up.    HPI:        58 yo male EX-SMOKER (1/2 ppd x 15 years= 7.5 pack-years; quit 1999) presenting FOLLOW UP VISIT for ELEVATED BILIRUBIN.        EMR/[facility] records reviewed.       Last seen by me on 5/10/24  FOLLOW UP VISIT TO DISCUSS and REVIEW TEST RESULTS  INCLUDING ELEVATED BILIRUBIN.  At visit:    Hypothyroidism: TSH 4.12 (3/18/24)< 12.42 (2/17/24)  -cont  levothyroxine 75 mcg daily in the morning on an empty stomach and do not eat for 30 minutes after taking medication.  -recheck TSH in 8 weeks at follow up appt     Persistently elevated bilirubin : 1.5 (3/18/24)< 2.3  -previously ordered referral to GI==> RE-ORDERED GI REFERRAL TODAY      Bell's Palsy mostly likely due to HSV (per inpatient neurology note 1/15/24):  -cont white petrolatum-mineral oiL 94-3 % ophthalmic ointment; Commonly known as: Tears Naturale PM; Apply 1 Application to both eyes once daily at  bedtime to prevent eye ulceration  -neurology referral ordered 2/16/24==> has scheduled appt  -ophthalmology referral ordered 2/16/24==> has scheduled appt  -advised patient to cont white petrolatum-mineral oiL 94-3 % ophthalmic ointment; Commonly known as: Tears Naturale PM; Apply 1 Application to both eyes once daily at  bedtime  until seen by ophthamology  -recommend shingles vaccine; patient would like to receive at follow-up visit  -emergency Dept and 911/EMS precautions discussed and reviewed     Hyperlipidemia  -cont atorvastatin 40 mg daily  -low fat,  low cholesterol diet, regularly exercise, and limit alcohol intake     Prediabetes: TODAY  5.5 (5/10/24)<  5.9 (2/19/24)  -POCT HGBA1c TODAY : 5.5  -low carbohydrate, low cholesterol, low fat diet, regularly exercise, and limit alcohol intake   -HgBA1c next due  8/9/2024     Vitamin D deficiency  -cont over the counter Vitamin D3 2,000 units daily       Colon Cancer Screening: COLOGUATYLER  negative 3/30/24==> NEXT cologuard due 3/30/27         Immunizations Counseling  -flu vaccine and shingles vaccine #2  now due==> shingles #2/2 received today  -recommend updated COVID spike vaccine that can be obtained at local pharmacy     FOLLOW-UP: 8 weeks for PHYSICAL         Also seen by me on 3/15/24 for FOLLOW UP VISIT TO DISCUSS and REVIEW TEST RESULTS.  At visit:     Hypothyroidism: TSH 12.42 (2/17/24)  -TSH/T4 ordered; will adjust levothyroxine dosing accordingly.   -cont  levothyroxine 75 mcg daily in the morning on an empty stomach and do not eat for 30 minutes after taking medication.        elevated bilirubin (2.3)   -repeat CMP ordered  -previously ordered referral to GI.      Bell's Palsy mostly likely due to HSV (per inpatient neurology note 1/15/24):  -cont white petrolatum-mineral oiL 94-3 % ophthalmic ointment; Commonly known as: Tears Naturale PM; Apply 1 Application to both eyes once daily at  bedtime to prevent eye ulceration  -neurology referral ordered 2/16/24==> has scheduled appt  -ophthalmology referral ordered 2/16/24==> has scheduled appt  -advised patient to cont white petrolatum-mineral oiL 94-3 % ophthalmic ointment; Commonly known as: Tears Naturale PM; Apply 1 Application to both eyes once daily at  bedtime  until seen by ophthamology  -recommend shingles vaccine; patient would like to receive at follow-up visit  -emergency Dept and 911/EMS precautions discussed and reviewed     Hyperlipidemia  -cont atorvastatin 40 mg daily  -low fat,  low cholesterol diet, regularly exercise, and limit alcohol intake     Prediabetes: 5.9 (2/19/24)  -low carbohydrate, low cholesterol, low fat diet, regularly exercise, and limit alcohol intake   -HgBA1c next due  5/2024     Vitamin D deficiency  -cont over the counter Vitamin D3 2,000 units daily       Colon Cancer Screening: NOW DUE   -colonoscopy ordered 2/16/24==> cologuard testing ordered today since patient prefers over colonoscopy          Immunizations Counseling  -flu vaccine and shingles vaccine now due==> shingles #1/2 received today  -recommend updated COVID spike vaccine that can be obtained at local pharmacy     FOLLOW-UP: 8 weeks for PHYSICAL           Also  seen by me on 2/16/24 as a NPV to and HOSPITAL FOLLOW-UP to ESTABLISH PCP CARE and for CARE GAP REVIEW. At visit:  -ORDERED LABS==> COMPLETED     Bell's Palsy mostly likely due to HSV (per inpatient neurology note 1/15/24):  -cont white petrolatum-mineral oiL 94-3 % ophthalmic ointment; Commonly known as: Tears Naturale PM; Apply 1 Application to both eyes once daily at  bedtime to prevent eye ulceration  -neurology referral ordered  -ophthalmology referral ordered  -advised patient to cont white petrolatum-mineral oiL 94-3 % ophthalmic ointment; Commonly known as: Tears Naturale PM; Apply 1 Application to both eyes once daily at  bedtime  until seen by ophthamology  -recommend shingles vaccine; patient would like to receive at follow-up visit  -emergency Dept and 911/EMS precautions discussed and reviewed      Colon Cancer Screening: NOW DUE   -colonoscopy ordered     Heart Disease Screening:  -CT Heart Ca scoring ordered==> COMPLETED     -flu vaccine, shingles vaccine, and TDAP now due==> RECEIVED TDAP TODAY 2/16/24  -recommend updated COVID spike vaccine that can be obtained at local pharmacy     FOLLOW-UP: 4 weeks to discuss and review test results and 8 weeks for PHYSICAL        After visit on 2/16/24 based on test results:     -Vit D deficiency: patient advised to please start over the counter Vitamin D3 2,000 units daily     -hypothyroidism,/low thyroid function that needs to be treated. Shanti advised that I recommend starting levothyroxine 75 mcg daily to treat hypothyroidism that was sent to his pharmacy. Please take daily in the morning on an empty stomach and do not eat for 30 minutes after taking medication. We will plan to repeat TSH levels in 1 month after you start  "levothyroxine, and adjust your medication  dosing accordingly.     -normal kidney function. Normal glucose and electrolytes     -elevated bilirubin (2.3) that needs to be evaluated by gastroenterology (GI). Patient advised that I have ordered  a referral to GI.      -urinalysis grossly normal, with no signs of UTI although shows 3+ bacteria but unclear if normal urogenital hunter. Added a urine culture from urine sample. Very low suspicion for a UTI given no symptoms     -prediabetes (HgBA1c 5.9); patient advised to please follow a low carbohydrate, low cholesterol, low fat diet, regularly exercise, and limit alcohol intake     -HIV, syphilis, Hep C negative     -Vit B12 levels normal     -PSA screening normal; next due in 1 year (2/2025)     -gonorrhea, chlamydia, trichomonas negative      -urine culture is negative for UTI      CT Heart Ca scoring (3/10/24):  FINDINGS:  The score and distribution of calcium in the coronary arteries is as  follows:      LM 0,  LAD 0,  LCx 0,  RCA 0,      Total   0      The visualized mid/lower ascending thoracic aorta, is normal in size,  measuring 34 mm in diameter. The heart is normal in size. No  pericardial effusion is present.Main pulmonary artery is normal in  caliber.      There is no evidence of lymphadenopathy or mass within the visualized  mediastinum.The visualized esophagus is unremarkable.      The visualized segments of the lungsare normally expanded.      The visualized subdiaphragmatic structures demonstrate no remarkable  findings.          IMPRESSION:  Coronary artery calcium score of 0            Admitted 1/25/24 and discharged 1/25/24 for RIGHT sided facial droop and weakness to r/o Bell's Balsy vs Stroke.  Per Hospital Discharge Summary 1/25/24:     \"Hospital Course  This is 59 years old male with no significant past medical history except high cholesterol.  Patient presented to [facility] ER with complaint of right facial numbness and drooping x 2 days .  Blood " "pressure was slightly high has to follow-up with his primary doctor as outpatient.  Seen  by neurology his right facial drooping is related to Bell's palsy.  Has to follow-up with neurology as outpatient.  Patient is hemodynamically stable.      Physical exam  Physical Exam  General: alert, no diaphoresis   HENT: Right-sided facial drooping . mucous membranes moist, external ears normal, no rhinorrhea   Eyes: no icterus or injection, no discharge   Lungs: CTA BL   Heart: RRR, no murmurs, no LE edema BL   GI: abdomen soft, nontender, nondistended, BS present   MSK: no joint effusion or deformity   Skin: no rashes, erythema, or ecchymosis   Neuro: grossly normal cognition, motor strength, sensation     Medication List      START taking these medications     atorvastatin 40 mg tablet; Commonly known as: Lipitor; Take 1 tablet (40   mg) by mouth once daily at bedtime.   white petrolatum-mineral oiL 94-3 % ophthalmic ointment; Commonly known   as: Tears Naturale PM; Apply 1 Application to both eyes once daily at   bedtime.        Outpatient Follow-Up  No future appointments.\"        Seen by Neurology during admission 1/25/24.  Per Neurology provider note:     Date of Service: 1/25/2024, Hospital Day: 1      Inpatient consult to Neurology  Consult performed by: Randall Becerra MD  Consult ordered by: Eliz Zhang MD  Reason for consult: facial weakness  Assessment/Recommendations:      Impressions: Right Bell's palsy - likely HSV related and not VZV. No trigger identified in this otherwise healthy man. At risk for stroke and in the slight possibility of stroke we had to rule it out. His neurological examination is completely normal except for cranial nerve VII and only on the right. He has a Bell's phenomenon but is at risk of developing a corneal drying/ulcer OD.      Recommendations/Plans: Establish a PCP with Dr. Monge/Izabel Live, APRN-CNP to lower stroke lists. TIA/stroke 911 precautions given. He is " neurologically stable to go home tonight with Lacri-Lube nightly.  Steroids are optional at this point and antivirals are not helpful.  He was warned about the facial disfigurement and given the example of Chetan Ambrosio to ease him into the unfamiliarity and the long-term possibilities.  He was also warned about crocodile tears.  No neurological outpatient follow-up is necessary.        Inpatient consult to Neurology  Consult performed by: Randall Becerra MD  Consult ordered by: Mendez Alcantara PA-C        History Of Present Illness: Sohan is a 59-year-old right-handed man former smoker past medical history of hypertension and hyperlipidemia (untreated) who has a rather unusual story as follows: He just did not feel well last week whelping pain behind his right ear later moved into his ear and sought help from urgent care on 1/22/2024.  They said they could see something happening in that ear and gave him antibiotics and steroid tapering he did not start the steroids until Tuesday.  He says that he had an asymmetry of his face for a few days (very vague historian, seem anxious and overwhelmed) but it was the numbness in the right side of his face that sent him to the [facility] emergency room today from work.  Upon arrival blood pressure was elevated (159/97 mmHg).  CT head without IV contrast (1/25/2024, 1031) was normal.  The ED course neurological course is documented in my ED note and because of the possibility that he had a brainstem stroke instead he was kept for neuroimaging completed this afternoon.  MRI brain (1/25/2024) was unremarkable.  MRA brain (1/25/24) was also normal. He doesn't feel any different now than this morning - feel odd in his face, pointing to the left side of his face as different than his right. No pain. OD has decreased vision from childhood due to amblyopia but does not feel like a foreign object is in there no pain or redness. . Otological review of systems also normal.        Medical  "Decision Making:  Vascular US Carotid Artery Duplex Bilateral (2024): Less than 50% stenosis of the bilateral internal carotid arteries.       MR angio head wo IV contrast (2024): Within normal limits. I personally reviewed these images and concur with the radiological interpretation.       MR brain wo IV contrast (2024 2:29 pm): Within normal limits. I personally reviewed these images and concur with the radiological interpretation.       CT head wo IV contrast (2024): Unremarkably normal. I personally reviewed these images and concur with the radiological interpretation.  \"        PMHx:  -elevated BP==> was not starting on BP medications when admitted 24  -hyperlipidemia (, , ; 24)==> started on atorvastatin 40 mg daily  -Bell's palsy==> on white petrolatum-mineral oiL 94-3 % ophthalmic ointment; Commonly known as: Tears Naturale PM; Apply 1 Application to both eyes once daily at  bedtime to prevent eye ulceration  -elevated glucose 111; 24  -elevated bilirubin 1.6;      Healthcare Providers:  PCP: none  Neurology:  NONE==>  ORDERED REFERRAL to NEUROLOGY TODAY 24  Ophthalmology: NONE==> ORDERED REFERRAL to OPHTHALMOLOGY TODAY 24     Preventive Health Services:  -Last physical: 5+ years  -last PSA screenin24  -cologuard colon cancer screening (3/30/24) negative: NEXT DUE 3/30/27  -last STI screening: HIV, syphilis, GC/CT, trich negative 24  -Hep C screening:  negative 24     Immunizations:   -Childhood vaccines: completed per patient    -COVID vaccine and booster: DID NOT RECEIVE  -updated COVID spike vaccine: NOW DUE       Immunization History   Administered Date(s) Administered    Tdap vaccine, age 7 year and older (BOOSTRIX, ADACEL) 2024    Zoster vaccine, recombinant, adult (SHINGRIX) 03/15/2024, 05/10/2024                   INTERVAL HISTORY:     -saw THOMPSON Ko MD 24 for elevated bilirubin. Per provider " "note:  \"Sohan Valdez is a 59 y.o. male with dyslipidemia, Bell’s palsy, and hypothyroidism who presented for consultation for elevated total bilirubinemia.  Patient has no GI symptoms and no risk factors for liver disease.  We will repeat liver enzymes including a direct bilirubin level today.  If his hyperbilirubinemia is coming from indirect hyperbilirubinemia, the likely cause would be a harmless condition called Gilbert syndrome which does need further follow-up.  If he has direct hyperbilirubinemia, we will perform further liver evaluation and abdominal ultrasound. \"    -saw neurology 6/24/24. Per provider note:  \"Sohan Valdez is a 59-year-old healthy man who developed right Bell's palsy shortly after right ear infection in January.  His Bell's palsy is likely viral in origin given the ear infection.  He presented to the ED at that time where he had full workup done including MRI brain with MRA that we reviewed today; unremarkable.  He was not treated with steroid or antiviral.  His symptoms significantly improved since the onset and now he only has mild right mouth weakness on neuroexam which is the recovery expected for mild Bell's palsy.  As patient is doing very well with minimal to no symptoms, no further neurology follow-up is needed. \"    -saw ophthalmology 6/25/24. Per provider note:  \"Combined form of age-related cataract, right eyeH25.811    Combined form of age-related cataract, left eyeH25.812    -Not visually significant at this time. Monitor.        History of Bell's Palsy  -January 2024 - had ear infection, started on antibiotics and possible with steroids  -MRI brain wo IV contrast and MR angio head wo IV contrast (1/25/24) - WNL.   -Symptoms fully resolved within weeks of onset, used OTC gtts and ointment.   -Monitor. Cornea intact.     Posterior vitreous detachment, bilateral  -No retinal tear or detachment seen on exam. Retinal detachment symptoms discussed.      Amblyopia, right " "eye  Hyperopia  Astigmatism  Presbyopia  -New Rx given, recommend full time polycarbonate wear for eye protection. \"         Today Sohan reports:     - doing and feeling with no complaints    -saw neurology, ophthalmology, and GI, and audiology     -resolved right sided facial droop. He denies any facial or ear pain, or facial numbness or tingling.      -taking all medications as prescribed with no reported adverse medication side effects     Today he has no other reported complaints, issues, or problems.     ROS is NEG for HEADACHE, NAUSEA, VOMITING, DIARRHEA, CHEST PAIN, SOB, and BLEEDING.  Review of systems (10+) negative for all systems except for any identified issues in HPI above.        Sexually active with wife.  Denies history STIs        SHx:  -lives with wife and daughter (22 yo)  -employment:   -tobacco use: EX-SMOKER (1/2 ppd x 15 years= 7.5 pack-years; quit )  -alcohol use: 2 beers every other other weeks  -illicits: DENIES        FHx:  Cancer:  -prostate cancer: father ()  HTN: mother  DM: DENIES  Heart Disease: DENIES  Stroke: DENIES  Thyroid Disease: DENIES         Objective     /78   Pulse 65   Temp 37 °C (98.6 °F)   Ht 1.829 m (6')   Wt 74.8 kg (165 lb)   SpO2 97%   BMI 22.38 kg/m²      Physical Examination:       GENERAL           General Appearance: well-appearing, well-developed, well-hydrated, well-nourished, no acute distress.        HEENT           NECK supple, no masses or thyromegaly, no carotid bruit.        EYES           Extraocular Movements: normal, bilateral eyes LESLY, no conjunctival injection.        HEART           Rate and Rhythm regular rate and rhythm. Heart sounds: normal S1S2, no S3 or S4. Murmurs: none.        CHEST           Breath sounds: Clear to IPPA, RR<16 no use of accessory muscles.        ABDOMEN           General: Neg for LKKS or masses, no scleral icterus or jaundice.        MUSCULOSKELETAL           Joints Demonstration: " Neg for erythema, swelling or joint deformities. gross abnormalities no gross abnormalities.        EXTREMITIES           Lower Extremities: Neg for cyanosis, clubbing or edema.       Assessment/Plan   Problem List Items Addressed This Visit       Bell palsy     Other Visit Diagnoses       Acquired hypothyroidism    -  Primary    Relevant Orders    Tsh With Reflex To Free T4 If Abnormal    Serum total bilirubin elevated        Immunization counseling        Vitamin D deficiency        Mixed hyperlipidemia        Prediabetes              Hypothyroidism: TSH 4.12 (3/18/24)< 12.42 (2/17/24)  -cont  levothyroxine 75 mcg daily in the morning on an empty stomach and do not eat for 30 minutes after taking medication.  -repeat TSH ordered     Persistently elevated bilirubin : 1.7 (6/13/24)< 1.5 (3/18/24)< 2.3. Followed by GI  -cont management per GI     Bell's Palsy mostly likely due to HSV (per inpatient neurology note 1/15/24): evaluated by neurology and ophthalmology  -cont management per neurology  and ophthalmology   -emergency Dept and 911/EMS precautions discussed and reviewed     Hyperlipidemia  -cont atorvastatin 40 mg daily  -low fat,  low cholesterol diet, regularly exercise, and limit alcohol intake     Prediabetes: 5.5 (5/10/24)<  5.9 (2/19/24)  -low carbohydrate, low cholesterol, low fat diet, regularly exercise, and limit alcohol intake   -HgBA1c next due  8/9/2024     Vitamin D deficiency  -cont over the counter Vitamin D3 2,000 units daily       Colon Cancer Screening: COLOGUARD negative 3/30/24==> NEXT cologuard due 3/30/27         Counseling:       Medication education:         Education:  The patient is counseled regarding potential side-effects of all new medications        Understanding:  Patient expressed understanding        Adherence:  No barriers to adherence identified        Immunizations Counseling  -recommend updated COVID spike vaccine that can be obtained at local pharmacy     FOLLOW-UP: 4-6  weeks for PHYSICAL     Discussed recommended plan of care with patient. Patient expressed understanding and agreement with plan of care. All of patient's questions were answered at the time. Patient had no additional questions at the time.           Jessy Alvarado MD, PhD

## 2024-07-08 ENCOUNTER — OFFICE VISIT (OUTPATIENT)
Dept: PRIMARY CARE | Facility: CLINIC | Age: 59
End: 2024-07-08
Payer: COMMERCIAL

## 2024-07-08 VITALS
HEIGHT: 72 IN | TEMPERATURE: 98.6 F | HEART RATE: 65 BPM | SYSTOLIC BLOOD PRESSURE: 120 MMHG | DIASTOLIC BLOOD PRESSURE: 78 MMHG | WEIGHT: 165 LBS | BODY MASS INDEX: 22.35 KG/M2 | OXYGEN SATURATION: 97 %

## 2024-07-08 DIAGNOSIS — E55.9 VITAMIN D DEFICIENCY: ICD-10-CM

## 2024-07-08 DIAGNOSIS — R73.03 PREDIABETES: ICD-10-CM

## 2024-07-08 DIAGNOSIS — E78.2 MIXED HYPERLIPIDEMIA: ICD-10-CM

## 2024-07-08 DIAGNOSIS — R17 SERUM TOTAL BILIRUBIN ELEVATED: ICD-10-CM

## 2024-07-08 DIAGNOSIS — G51.0 BELL PALSY: ICD-10-CM

## 2024-07-08 DIAGNOSIS — Z71.85 IMMUNIZATION COUNSELING: ICD-10-CM

## 2024-07-08 DIAGNOSIS — E03.9 ACQUIRED HYPOTHYROIDISM: Primary | ICD-10-CM

## 2024-07-08 PROCEDURE — 99214 OFFICE O/P EST MOD 30 MIN: CPT | Performed by: FAMILY MEDICINE

## 2024-07-08 PROCEDURE — 1036F TOBACCO NON-USER: CPT | Performed by: FAMILY MEDICINE

## 2024-07-08 ASSESSMENT — PATIENT HEALTH QUESTIONNAIRE - PHQ9
SUM OF ALL RESPONSES TO PHQ9 QUESTIONS 1 AND 2: 0
2. FEELING DOWN, DEPRESSED OR HOPELESS: NOT AT ALL
1. LITTLE INTEREST OR PLEASURE IN DOING THINGS: NOT AT ALL

## 2024-07-08 ASSESSMENT — PAIN SCALES - GENERAL: PAINLEVEL: 0-NO PAIN

## 2024-07-10 ENCOUNTER — LAB (OUTPATIENT)
Dept: LAB | Facility: LAB | Age: 59
End: 2024-07-10
Payer: COMMERCIAL

## 2024-07-10 DIAGNOSIS — E03.9 ACQUIRED HYPOTHYROIDISM: ICD-10-CM

## 2024-07-10 LAB
T4 FREE SERPL-MCNC: 1.2 NG/DL (ref 0.9–1.7)
TSH SERPL DL<=0.05 MIU/L-ACNC: 5.27 MIU/L (ref 0.27–4.2)

## 2024-07-10 PROCEDURE — 36415 COLL VENOUS BLD VENIPUNCTURE: CPT

## 2024-07-10 PROCEDURE — 84443 ASSAY THYROID STIM HORMONE: CPT

## 2024-07-10 PROCEDURE — 84439 ASSAY OF FREE THYROXINE: CPT

## 2024-07-19 PROCEDURE — RXMED WILLOW AMBULATORY MEDICATION CHARGE

## 2024-07-22 ENCOUNTER — PHARMACY VISIT (OUTPATIENT)
Dept: PHARMACY | Facility: CLINIC | Age: 59
End: 2024-07-22
Payer: COMMERCIAL

## 2024-08-01 ENCOUNTER — APPOINTMENT (OUTPATIENT)
Dept: OPHTHALMOLOGY | Facility: CLINIC | Age: 59
End: 2024-08-01
Payer: COMMERCIAL

## 2024-08-06 NOTE — PATIENT INSTRUCTIONS
It was nice seeing you today.     Please continue to take all medications as prescribed      Please call referral line to schedule: 1)  follow up appointment with GI for elevated bilirubin; and 2) appt with endocrinology for hypothyroidism      I recommend the updated COVID vaccine that can be obtained at your local pharmacy     Please schedule a follow up with me in 12 weeks for hypothyriodism and repeat lab for thyroid function

## 2024-08-06 NOTE — PROGRESS NOTES
Subjective   Sohan Valdez is a 59 y.o. male who presents for  ANNUAL PHYSICAL EXAM.    HPI:    59 y.o. male EX-SMOKER (1/2 ppd x 15 years= 7.5 pack-years; quit 1999) who presents for  ANNUAL PHYSICAL EXAM.     EMR/[facility] records reviewed.    Last seen by me on 7/8/24  for FOLLOW UP VISIT for ELEVATED BILIRUBIN.  At visit:    Hypothyroidism:  4.12 (3/18/24)< 12.42 (2/17/24)  -cont  levothyroxine 75 mcg daily in the morning on an empty stomach and do not eat for 30 minutes after taking medication.  -repeat TSH ordered      Persistently elevated bilirubin : 1.7 (6/13/24)< 1.5 (3/18/24)< 2.3. Followed by GI  -cont management per GI     Bell's Palsy mostly likely due to HSV (per inpatient neurology note 1/15/24): evaluated by neurology and ophthalmology  -cont management per neurology  and ophthalmology   -emergency Dept and 911/EMS precautions discussed and reviewed     Hyperlipidemia  -cont atorvastatin 40 mg daily  -low fat,  low cholesterol diet, regularly exercise, and limit alcohol intake     Prediabetes: 5.5 (5/10/24)<  5.9 (2/19/24)  -low carbohydrate, low cholesterol, low fat diet, regularly exercise, and limit alcohol intake   -HgBA1c next due  8/9/2024     Vitamin D deficiency  -cont over the counter Vitamin D3 2,000 units daily       Colon Cancer Screening: COLOGUARD negative 3/30/24==> NEXT cologuard due 3/30/27         Immunizations Counseling  -recommend updated COVID spike vaccine that can be obtained at local pharmacy     FOLLOW-UP: 4-6 weeks for PHYSICAL                Also seen by me on 5/10/24  FOLLOW UP VISIT TO DISCUSS and REVIEW TEST RESULTS  INCLUDING ELEVATED BILIRUBIN.  At visit:     Hypothyroidism: TSH 4.12 (3/18/24)< 12.42 (2/17/24)  -cont  levothyroxine 75 mcg daily in the morning on an empty stomach and do not eat for 30 minutes after taking medication.  -recheck TSH in 8 weeks at follow up appt     Persistently elevated bilirubin : 1.5 (3/18/24)< 2.3  -previously ordered referral to GI==>  RE-ORDERED GI REFERRAL TODAY      Bell's Palsy mostly likely due to HSV (per inpatient neurology note 1/15/24):  -cont white petrolatum-mineral oiL 94-3 % ophthalmic ointment; Commonly known as: Tears Naturale PM; Apply 1 Application to both eyes once daily at  bedtime to prevent eye ulceration  -neurology referral ordered 2/16/24==> has scheduled appt  -ophthalmology referral ordered 2/16/24==> has scheduled appt  -advised patient to cont white petrolatum-mineral oiL 94-3 % ophthalmic ointment; Commonly known as: Tears Naturale PM; Apply 1 Application to both eyes once daily at  bedtime  until seen by ophthamology  -recommend shingles vaccine; patient would like to receive at follow-up visit  -emergency Dept and 911/EMS precautions discussed and reviewed     Hyperlipidemia  -cont atorvastatin 40 mg daily  -low fat,  low cholesterol diet, regularly exercise, and limit alcohol intake     Prediabetes: TODAY  5.5 (5/10/24)<  5.9 (2/19/24)  -POCT HGBA1c TODAY : 5.5  -low carbohydrate, low cholesterol, low fat diet, regularly exercise, and limit alcohol intake   -HgBA1c next due  8/9/2024     Vitamin D deficiency  -cont over the counter Vitamin D3 2,000 units daily       Colon Cancer Screening: COLOGUARD negative 3/30/24==> NEXT cologuard due 3/30/27         Immunizations Counseling  -flu vaccine and shingles vaccine #2  now due==> shingles #2/2 received today  -recommend updated COVID spike vaccine that can be obtained at local pharmacy     FOLLOW-UP: 8 weeks for PHYSICAL           Also seen by me on 3/15/24 for FOLLOW UP VISIT TO DISCUSS and REVIEW TEST RESULTS.  At visit:     Hypothyroidism: TSH 12.42 (2/17/24)  -TSH/T4 ordered; will adjust levothyroxine dosing accordingly.   -cont  levothyroxine 75 mcg daily in the morning on an empty stomach and do not eat for 30 minutes after taking medication.        elevated bilirubin (2.3)   -repeat CMP ordered  -previously ordered referral to GI.      Bell's Palsy mostly likely  due to HSV (per inpatient neurology note 1/15/24):  -cont white petrolatum-mineral oiL 94-3 % ophthalmic ointment; Commonly known as: Tears Naturale PM; Apply 1 Application to both eyes once daily at  bedtime to prevent eye ulceration  -neurology referral ordered 2/16/24==> has scheduled appt  -ophthalmology referral ordered 2/16/24==> has scheduled appt  -advised patient to cont white petrolatum-mineral oiL 94-3 % ophthalmic ointment; Commonly known as: Tears Naturale PM; Apply 1 Application to both eyes once daily at  bedtime  until seen by ophthamology  -recommend shingles vaccine; patient would like to receive at follow-up visit  -emergency Dept and 911/EMS precautions discussed and reviewed     Hyperlipidemia  -cont atorvastatin 40 mg daily  -low fat,  low cholesterol diet, regularly exercise, and limit alcohol intake     Prediabetes: 5.9 (2/19/24)  -low carbohydrate, low cholesterol, low fat diet, regularly exercise, and limit alcohol intake   -HgBA1c next due  5/2024     Vitamin D deficiency  -cont over the counter Vitamin D3 2,000 units daily       Colon Cancer Screening: NOW DUE   -colonoscopy ordered 2/16/24==> cologuard testing ordered today since patient prefers over colonoscopy         Immunizations Counseling  -flu vaccine and shingles vaccine now due==> shingles #1/2 received today  -recommend updated COVID spike vaccine that can be obtained at local pharmacy     FOLLOW-UP: 8 weeks for PHYSICAL           Also  seen by me on 2/16/24 as a NPV to and HOSPITAL FOLLOW-UP to ESTABLISH PCP CARE and for CARE GAP REVIEW. At visit:  -ORDERED LABS==> COMPLETED     Bell's Palsy mostly likely due to HSV (per inpatient neurology note 1/15/24):  -cont white petrolatum-mineral oiL 94-3 % ophthalmic ointment; Commonly known as: Tears Naturale PM; Apply 1 Application to both eyes once daily at  bedtime to prevent eye ulceration  -neurology referral ordered  -ophthalmology referral ordered  -advised patient to cont  white petrolatum-mineral oiL 94-3 % ophthalmic ointment; Commonly known as: Tears Naturale PM; Apply 1 Application to both eyes once daily at  bedtime  until seen by ophthamology  -recommend shingles vaccine; patient would like to receive at follow-up visit  -emergency Dept and 911/EMS precautions discussed and reviewed      Colon Cancer Screening: NOW DUE   -colonoscopy ordered     Heart Disease Screening:  -CT Heart Ca scoring ordered==> COMPLETED     -flu vaccine, shingles vaccine, and TDAP now due==> RECEIVED TDAP TODAY 2/16/24  -recommend updated COVID spike vaccine that can be obtained at local pharmacy     FOLLOW-UP: 4 weeks to discuss and review test results and 8 weeks for PHYSICAL        After visit on 2/16/24 based on test results:     -Vit D deficiency: patient advised to please start over the counter Vitamin D3 2,000 units daily     -hypothyroidism,/low thyroid function that needs to be treated. Montezt advised that I recommend starting levothyroxine 75 mcg daily to treat hypothyroidism that was sent to his pharmacy. Please take daily in the morning on an empty stomach and do not eat for 30 minutes after taking medication. We will plan to repeat TSH levels in 1 month after you start levothyroxine, and adjust your medication  dosing accordingly.     -normal kidney function. Normal glucose and electrolytes     -elevated bilirubin (2.3) that needs to be evaluated by gastroenterology (GI). Patient advised that I have ordered  a referral to GI.      -urinalysis grossly normal, with no signs of UTI although shows 3+ bacteria but unclear if normal urogenital hunter. Added a urine culture from urine sample. Very low suspicion for a UTI given no symptoms     -prediabetes (HgBA1c 5.9); patient advised to please follow a low carbohydrate, low cholesterol, low fat diet, regularly exercise, and limit alcohol intake     -HIV, syphilis, Hep C negative     -Vit B12 levels normal     -PSA screening normal; next due in 1  "year (2/2025)     -gonorrhea, chlamydia, trichomonas negative      -urine culture is negative for UTI      CT Heart Ca scoring (3/10/24):  FINDINGS:  The score and distribution of calcium in the coronary arteries is as  follows:      LM 0,  LAD 0,  LCx 0,  RCA 0,      Total   0      The visualized mid/lower ascending thoracic aorta, is normal in size,  measuring 34 mm in diameter. The heart is normal in size. No  pericardial effusion is present.Main pulmonary artery is normal in  caliber.      There is no evidence of lymphadenopathy or mass within the visualized  mediastinum.The visualized esophagus is unremarkable.      The visualized segments of the lungsare normally expanded.      The visualized subdiaphragmatic structures demonstrate no remarkable  findings.          IMPRESSION:  Coronary artery calcium score of 0            Admitted 1/25/24 and discharged 1/25/24 for RIGHT sided facial droop and weakness to r/o Bell's Balsy vs Stroke.  Per Hospital Discharge Summary 1/25/24:     \"Hospital Course  This is 59 years old male with no significant past medical history except high cholesterol.  Patient presented to [facility] ER with complaint of right facial numbness and drooping x 2 days .  Blood pressure was slightly high has to follow-up with his primary doctor as outpatient.  Seen  by neurology his right facial drooping is related to Bell's palsy.  Has to follow-up with neurology as outpatient.  Patient is hemodynamically stable.      Physical exam  Physical Exam  General: alert, no diaphoresis   HENT: Right-sided facial drooping . mucous membranes moist, external ears normal, no rhinorrhea   Eyes: no icterus or injection, no discharge   Lungs: CTA BL   Heart: RRR, no murmurs, no LE edema BL   GI: abdomen soft, nontender, nondistended, BS present   MSK: no joint effusion or deformity   Skin: no rashes, erythema, or ecchymosis   Neuro: grossly normal cognition, motor strength, sensation     Medication List    " "  START taking these medications     atorvastatin 40 mg tablet; Commonly known as: Lipitor; Take 1 tablet (40   mg) by mouth once daily at bedtime.   white petrolatum-mineral oiL 94-3 % ophthalmic ointment; Commonly known   as: Tears Naturale PM; Apply 1 Application to both eyes once daily at   bedtime.        Outpatient Follow-Up  No future appointments.\"        Seen by Neurology during admission 1/25/24.  Per Neurology provider note:     Date of Service: 1/25/2024, Hospital Day: 1      Inpatient consult to Neurology  Consult performed by: Randall Becerra MD  Consult ordered by: Eliz Zhang MD  Reason for consult: facial weakness  Assessment/Recommendations:      Impressions: Right Bell's palsy - likely HSV related and not VZV. No trigger identified in this otherwise healthy man. At risk for stroke and in the slight possibility of stroke we had to rule it out. His neurological examination is completely normal except for cranial nerve VII and only on the right. He has a Bell's phenomenon but is at risk of developing a corneal drying/ulcer OD.      Recommendations/Plans: Establish a PCP with Dr. Monge/ADY Denis-CNP to lower stroke lists. TIA/stroke 911 precautions given. He is neurologically stable to go home tonight with Lacri-Lube nightly.  Steroids are optional at this point and antivirals are not helpful.  He was warned about the facial disfigurement and given the example of Chetan Ambrosio to ease him into the unfamiliarity and the long-term possibilities.  He was also warned about crocodile tears.  No neurological outpatient follow-up is necessary.        Inpatient consult to Neurology  Consult performed by: Randall Becerra MD  Consult ordered by: Mendez Alcantara PA-C        History Of Present Illness: Sohan is a 59-year-old right-handed man former smoker past medical history of hypertension and hyperlipidemia (untreated) who has a rather unusual story as follows: He just did not feel well last " "week whelping pain behind his right ear later moved into his ear and sought help from urgent care on 1/22/2024.  They said they could see something happening in that ear and gave him antibiotics and steroid tapering he did not start the steroids until Tuesday.  He says that he had an asymmetry of his face for a few days (very vague historian, seem anxious and overwhelmed) but it was the numbness in the right side of his face that sent him to the [facility] emergency room today from work.  Upon arrival blood pressure was elevated (159/97 mmHg).  CT head without IV contrast (1/25/2024, 1031) was normal.  The ED course neurological course is documented in my ED note and because of the possibility that he had a brainstem stroke instead he was kept for neuroimaging completed this afternoon.  MRI brain (1/25/2024) was unremarkable.  MRA brain (1/25/24) was also normal. He doesn't feel any different now than this morning - feel odd in his face, pointing to the left side of his face as different than his right. No pain. OD has decreased vision from childhood due to amblyopia but does not feel like a foreign object is in there no pain or redness. . Otological review of systems also normal.        Medical Decision Making:  Vascular US Carotid Artery Duplex Bilateral (1/25/2024): Less than 50% stenosis of the bilateral internal carotid arteries.       MR angio head wo IV contrast (1/25/2024): Within normal limits. I personally reviewed these images and concur with the radiological interpretation.       MR brain wo IV contrast (1/25/2024 2:29 pm): Within normal limits. I personally reviewed these images and concur with the radiological interpretation.       CT head wo IV contrast (1/25/2024): Unremarkably normal. I personally reviewed these images and concur with the radiological interpretation.  \"        PMHx:  -elevated BP==> was not starting on BP medications when admitted 1/25/24  -hyperlipidemia (, , ; " "1/25/24)==> started on atorvastatin 40 mg daily  -Bell's palsy  -hypothyroidism  -elevated bilirubin  -Vit D deficiency  -elevated glucose 111; 1/25/24  -elevated bilirubin 1.6; 1.25/24     Healthcare Providers:  Neurology:  Pk Muhammad MD   Ophthalmology: Autumn Julien MD   GI: Dr. Ko    saw GI Vu LOLI Ko MD 6/13/24 for elevated bilirubin. Per provider note:  \"Sohan Valdez is a 59 y.o. male with dyslipidemia, Bell’s palsy, and hypothyroidism who presented for consultation for elevated total bilirubinemia.  Patient has no GI symptoms and no risk factors for liver disease.  We will repeat liver enzymes including a direct bilirubin level today.  If his hyperbilirubinemia is coming from indirect hyperbilirubinemia, the likely cause would be a harmless condition called Gilbert syndrome which does need further follow-up.  If he has direct hyperbilirubinemia, we will perform further liver evaluation and abdominal ultrasound. \"     -saw neurology 6/24/24. Per provider note:  \"Sohan Valdez is a 59-year-old healthy man who developed right Bell's palsy shortly after right ear infection in January.  His Bell's palsy is likely viral in origin given the ear infection.  He presented to the ED at that time where he had full workup done including MRI brain with MRA that we reviewed today; unremarkable.  He was not treated with steroid or antiviral.  His symptoms significantly improved since the onset and now he only has mild right mouth weakness on neuroexam which is the recovery expected for mild Bell's palsy.  As patient is doing very well with minimal to no symptoms, no further neurology follow-up is needed. \"     -saw ophthalmology 6/25/24. Per provider note:  \"Combined form of age-related cataract, right eyeH25.811     Combined form of age-related cataract, left eyeH25.812     -Not visually significant at this time. Monitor.         History of Bell's Palsy  -January 2024 - had ear infection, started on " "antibiotics and possible with steroids  -MRI brain wo IV contrast and MR angio head wo IV contrast (24) - WNL.   -Symptoms fully resolved within weeks of onset, used OTC gtts and ointment.   -Monitor. Cornea intact.     Posterior vitreous detachment, bilateral  -No retinal tear or detachment seen on exam. Retinal detachment symptoms discussed.      Amblyopia, right eye  Hyperopia  Astigmatism  Presbyopia  -New Rx given, recommend full time polycarbonate wear for eye protection. \"          Preventive Health Services:  -Last physical: 5+ years  -last PSA screenin24 WNL  -cologuard colon cancer screening (3/30/24) negative: NEXT DUE 3/30/27  -last STI screening: HIV, syphilis, GC/CT, trich negative 24  -Hep C screening:  negative 24  -CT Heart Ca scoring (3/10/24): Coronary artery calcium score of 0      Immunizations:   -Childhood vaccines: completed per patient    -updated COVID spike vaccine: NOW DUE        Immunization History   Administered Date(s) Administered    Tdap vaccine, age 7 year and older (BOOSTRIX, ADACEL) 2024    Zoster vaccine, recombinant, adult (SHINGRIX) 03/15/2024, 05/10/2024               INTERVAL HISTORY:   -completed labs: TSH 5.27, thyroxine WNL (7/10/24) ==> subclinical hypothyroidism==> ordered referral to endocrinology      -Today Sohan reports:     - doing and feeling with no complaints     -does not have scheduled appt with endocrinology; he states that he will call and schedule     -resolved right sided facial droop. He denies any facial or ear pain, or facial numbness or tingling.      -taking all medications as prescribed with no reported adverse medication side effects     Today he has no other reported complaints, issues, or problems.     ROS is NEG for HEADACHE, NAUSEA, VOMITING, DIARRHEA, CHEST PAIN, SOB, and BLEEDING.  Review of systems (12) negative for all systems except for any identified issues in HPI above.        Sexually active with " "wife.  Denies history STIs        SHx:  -lives with wife and daughter (22 yo)  -employment:   -tobacco use: EX-SMOKER (1/2 ppd x 15 years= 7.5 pack-years; quit )  -alcohol use: 2 beers every other other weeks  -illicits: DENIES        FHx:  Cancer:  -prostate cancer: father ()  HTN: mother  DM: DENIES  Heart Disease: DENIES  Stroke: DENIES  Thyroid Disease: DENIES         Objective     /76   Pulse 90   Temp 36.5 °C (97.7 °F)   Ht 1.842 m (6' 0.5\")   Wt 74.8 kg (164 lb 12.8 oz)   SpO2 97%   BMI 22.04 kg/m²      Physical Examination:       GENERAL           General Appearance: pleasant, well-appearing, well-developed, well-hydrated, well-nourished, well-groomed.        HEENT           NECK supple, Neg for adneopathy no thyroid enlargement or nodules, Oropharynx normal no exudates. EARS: TMs intact, normal, no effusions       EYES           Pupils: PERRLA, no photophobia.        HEART           Rate and Rhythm regular rate and rhythm. Heart sounds: normal S1S2. Murmurs: none.        LUNGS           Effort: Normal chest wall, no pectus, Normal air entry all fields, Clear to IPPA, RR<16 with no use of accessory muscles.           BACK           General: unremarkable, no spinal tenderness or rashes.        ABDOMEN           General: Normal to inspection, neg for LKKS or masses and BSs heard in all quadrants               LYMPHATICS           Cervical: none. Axillary: none.        MUSCULOSKELETAL           gross abnormalities no gross abnormalities, no joint redness or swelling.        EXTREMITIES           Varicose veins: not present. Pulses: 2+ bilateral. Clubbing: none. Cyanosis: no.        NEUROLOGICAL           Orientation: alert and oriented x 3. Grossly normal: yes. Plantars: downgoing bilaterally. Muscle Bulk: normal . Cranial Nerves: CN's II-XII grossly intact.        PSYCHOLOGY           Affect: appropriate. Mood: pleasant.        SKIN: no rashes and no lesions       " (patient declined chaperone): Penis: normal circumcised phallus no lesions. Scrotum: no lesions. Testicles: descended bilaterally, non-tender to palpation, no palpable masses or inguinal hernias. No inguinal LAD.        Assessment/Plan   Problem List Items Addressed This Visit       Bell palsy     Other Visit Diagnoses       Physical exam    -  Primary    Acquired hypothyroidism        Relevant Orders    Referral to Endocrinology    Serum total bilirubin elevated        Immunization counseling        Vitamin D deficiency        Mixed hyperlipidemia        Prediabetes        Prostate cancer screening        Colon cancer screening        Skin cancer screening        Relevant Orders    Referral to Dermatology          Annual Physical Exam: completed today; NEXT DUE 8/8/25    Hypothyroidism: TSH: 5.27, thyroxine WNL (7/10/24; subclinical hypothyroidism)< 4.12 (3/18/24)< 12.42 (2/17/24)  -cont  levothyroxine 75 mcg daily in the morning on an empty stomach and do not eat for 30 minutes after taking medication.  -referral to endocrinology previously ordered for evaluation of subclinical hypothyroidism==> RE-ORDERED REFERRAL TODAY  -repeat TSH next due in 3 months (11/2024)     Persistently elevated bilirubin : 1.7 (6/13/24)< 1.5 (3/18/24)< 2.3. Followed by GI  -cont management per GI     History of Bell's Palsy mostly likely due to HSV (per inpatient neurology note 1/15/24)==> RESOLVED Sxs: evaluated by neurology and ophthalmology  -cont management per neurology  and ophthalmology   -emergency Dept and 911/EMS precautions discussed and reviewed     Hyperlipidemia  -cont atorvastatin 40 mg daily  -low fat,  low cholesterol diet, regularly exercise, and limit alcohol intake     Prediabetes: 5.5 (5/10/24)<  5.9 (2/19/24)  -low carbohydrate, low cholesterol, low fat diet, regularly exercise, and limit alcohol intake   -HgBA1c next due  8/9/2024     Vitamin D deficiency  -cont over the counter Vitamin D3 2,000 units daily        Colon Cancer Screening: COLOGUARD negative 3/30/24==> NEXT cologuard due 3/30/27     Prostate Cancer Screening: PSA WNL 2/17/24; NEXT DUE 2/2025      Skin cancer screening:  -referral to dermatology ordered    Counseling:       Medication education:         Education:  The patient is counseled regarding potential side-effects of all new medications        Understanding:  Patient expressed understanding        Adherence:  No barriers to adherence identified        Immunizations Counseling  -recommend updated COVID spike vaccine that can be obtained at local pharmacy     FOLLOW-UP: 12 weeks to recheck TSH     Discussed recommended plan of care with patient. Patient expressed understanding and agreement with plan of care. All of patient's questions were answered at the time. Patient had no additional questions at the time.          Jessy Alvarado M.D.

## 2024-08-08 ENCOUNTER — OFFICE VISIT (OUTPATIENT)
Dept: PRIMARY CARE | Facility: CLINIC | Age: 59
End: 2024-08-08
Payer: COMMERCIAL

## 2024-08-08 VITALS
BODY MASS INDEX: 21.84 KG/M2 | HEIGHT: 73 IN | DIASTOLIC BLOOD PRESSURE: 76 MMHG | HEART RATE: 90 BPM | TEMPERATURE: 97.7 F | WEIGHT: 164.8 LBS | SYSTOLIC BLOOD PRESSURE: 108 MMHG | OXYGEN SATURATION: 97 %

## 2024-08-08 DIAGNOSIS — Z00.00 PHYSICAL EXAM: Primary | ICD-10-CM

## 2024-08-08 DIAGNOSIS — E78.2 MIXED HYPERLIPIDEMIA: ICD-10-CM

## 2024-08-08 DIAGNOSIS — Z12.83 SKIN CANCER SCREENING: ICD-10-CM

## 2024-08-08 DIAGNOSIS — R73.03 PREDIABETES: ICD-10-CM

## 2024-08-08 DIAGNOSIS — E55.9 VITAMIN D DEFICIENCY: ICD-10-CM

## 2024-08-08 DIAGNOSIS — G51.0 BELL PALSY: ICD-10-CM

## 2024-08-08 DIAGNOSIS — E03.9 ACQUIRED HYPOTHYROIDISM: ICD-10-CM

## 2024-08-08 DIAGNOSIS — Z71.85 IMMUNIZATION COUNSELING: ICD-10-CM

## 2024-08-08 DIAGNOSIS — Z12.5 PROSTATE CANCER SCREENING: ICD-10-CM

## 2024-08-08 DIAGNOSIS — R17 SERUM TOTAL BILIRUBIN ELEVATED: ICD-10-CM

## 2024-08-08 DIAGNOSIS — Z12.11 COLON CANCER SCREENING: ICD-10-CM

## 2024-08-08 PROCEDURE — 1036F TOBACCO NON-USER: CPT | Performed by: FAMILY MEDICINE

## 2024-08-08 PROCEDURE — 99396 PREV VISIT EST AGE 40-64: CPT | Performed by: FAMILY MEDICINE

## 2024-08-08 PROCEDURE — 3008F BODY MASS INDEX DOCD: CPT | Performed by: FAMILY MEDICINE

## 2024-08-08 ASSESSMENT — PAIN SCALES - GENERAL: PAINLEVEL: 0-NO PAIN

## 2024-08-08 ASSESSMENT — PATIENT HEALTH QUESTIONNAIRE - PHQ9
2. FEELING DOWN, DEPRESSED OR HOPELESS: NOT AT ALL
1. LITTLE INTEREST OR PLEASURE IN DOING THINGS: NOT AT ALL
SUM OF ALL RESPONSES TO PHQ9 QUESTIONS 1 AND 2: 0

## 2024-08-20 PROCEDURE — RXMED WILLOW AMBULATORY MEDICATION CHARGE

## 2024-08-22 ENCOUNTER — PHARMACY VISIT (OUTPATIENT)
Dept: PHARMACY | Facility: CLINIC | Age: 59
End: 2024-08-22
Payer: COMMERCIAL

## 2024-09-20 PROCEDURE — RXMED WILLOW AMBULATORY MEDICATION CHARGE

## 2024-09-21 ENCOUNTER — PHARMACY VISIT (OUTPATIENT)
Dept: PHARMACY | Facility: CLINIC | Age: 59
End: 2024-09-21
Payer: COMMERCIAL

## 2024-10-21 ENCOUNTER — PHARMACY VISIT (OUTPATIENT)
Dept: PHARMACY | Facility: CLINIC | Age: 59
End: 2024-10-21
Payer: COMMERCIAL

## 2024-10-21 PROCEDURE — RXMED WILLOW AMBULATORY MEDICATION CHARGE

## 2024-11-19 ENCOUNTER — PHARMACY VISIT (OUTPATIENT)
Dept: PHARMACY | Facility: CLINIC | Age: 59
End: 2024-11-19
Payer: COMMERCIAL

## 2024-11-19 PROCEDURE — RXMED WILLOW AMBULATORY MEDICATION CHARGE

## 2024-12-03 PROBLEM — E03.9 ACQUIRED HYPOTHYROIDISM: Status: ACTIVE | Noted: 2024-02-17

## 2024-12-03 PROBLEM — I10 PRIMARY HYPERTENSION: Status: ACTIVE | Noted: 2024-02-17

## 2024-12-03 PROBLEM — I67.82 CEREBRAL ISCHEMIA: Status: ACTIVE | Noted: 2024-12-03

## 2024-12-19 ENCOUNTER — APPOINTMENT (OUTPATIENT)
Dept: ENDOCRINOLOGY | Facility: CLINIC | Age: 59
End: 2024-12-19
Payer: COMMERCIAL

## 2024-12-19 ENCOUNTER — LAB (OUTPATIENT)
Dept: LAB | Facility: LAB | Age: 59
End: 2024-12-19
Payer: COMMERCIAL

## 2024-12-19 VITALS
DIASTOLIC BLOOD PRESSURE: 72 MMHG | HEIGHT: 72 IN | BODY MASS INDEX: 23.57 KG/M2 | SYSTOLIC BLOOD PRESSURE: 124 MMHG | WEIGHT: 174 LBS

## 2024-12-19 DIAGNOSIS — E78.00 HIGH CHOLESTEROL: ICD-10-CM

## 2024-12-19 DIAGNOSIS — I10 PRIMARY HYPERTENSION: ICD-10-CM

## 2024-12-19 DIAGNOSIS — E03.9 ACQUIRED HYPOTHYROIDISM: Primary | ICD-10-CM

## 2024-12-19 DIAGNOSIS — E03.9 ACQUIRED HYPOTHYROIDISM: ICD-10-CM

## 2024-12-19 LAB
THYROPEROXIDASE AB SERPL-ACNC: >1000 IU/ML
TSH SERPL-ACNC: 7.17 MIU/L (ref 0.44–3.98)

## 2024-12-19 PROCEDURE — 86376 MICROSOMAL ANTIBODY EACH: CPT

## 2024-12-19 PROCEDURE — 36415 COLL VENOUS BLD VENIPUNCTURE: CPT

## 2024-12-19 PROCEDURE — 99204 OFFICE O/P NEW MOD 45 MIN: CPT | Performed by: INTERNAL MEDICINE

## 2024-12-19 PROCEDURE — 3078F DIAST BP <80 MM HG: CPT | Performed by: INTERNAL MEDICINE

## 2024-12-19 PROCEDURE — 1036F TOBACCO NON-USER: CPT | Performed by: INTERNAL MEDICINE

## 2024-12-19 PROCEDURE — 3074F SYST BP LT 130 MM HG: CPT | Performed by: INTERNAL MEDICINE

## 2024-12-19 PROCEDURE — 84443 ASSAY THYROID STIM HORMONE: CPT

## 2024-12-19 PROCEDURE — 3008F BODY MASS INDEX DOCD: CPT | Performed by: INTERNAL MEDICINE

## 2024-12-19 NOTE — PROGRESS NOTES
Patient ID: Sohan Valdez is a 59 y.o. male who presents for New Patient Visit (Endocrine consult. Referred by Dr.Britt Smallwood for his thyroid.).  HPI  The patient is referred for evaluation of hypothyroidism.    This is a 59-year-old gentleman who had blood work done in February with a TSH of 12.42.    He was started on levothyroxine 75 mcg/day.    1 month later he had a TSH of 4.12 upper limits of normal 4.20.    In July he had a TSH of 5.27 but his dose was not adjusted.    He does take levothyroxine on an empty stomach.    He does not take supplements or biotin.    He does complain of fatigue dry skin cold intolerance constipation.    He has no symptoms of hyperthyroidism.    There is no family history of thyroid that he is aware of.    He has a past history of allergies hyperlipidemia hypertension.    Socially he is  works as a  non-smoker drinks alcohol on occasion.    Family history negative for diabetes or thyroid.    ROS  Comprehensive review of systems is negative.    Objective   Physical Exam  Visit Vitals  /72      Vitals:    12/19/24 0906   Weight: 78.9 kg (174 lb)      Body mass index is 23.6 kg/m².      Alert and oriented x3  In no distress  No focal neurologic deficits  No supraclavicular, or dorsal fat  No purple striae  Integument intact  Eyes normal  ENT normal. No adenopathy  Thyroid just palpable and normal. No nodules  Chest clear to auscultation  Heart sounds are normal  Abdomen nontender. Bowel sounds normal. No organomegaly  Feet are okay  Reflexes normal with normal return    Current Outpatient Medications   Medication Sig Dispense Refill    atorvastatin (Lipitor) 40 mg tablet Take 1 tablet (40 mg) by mouth once daily at bedtime. 30 tablet 11    fluticasone (Flonase) 50 mcg/actuation nasal spray Administer 2 sprays into each nostril once daily.      levothyroxine (Synthroid) 75 mcg tablet Take 1 tablet (75 mcg) by mouth once daily in the morning. Take  before meals. 30 tablet 11     No current facility-administered medications for this visit.       Assessment/Plan     1.  Hypothyroidism under replaced  2.  Hypertension  3.  Hyperlipidemia    We discussed the diagnosis of hypothyroidism.    We discussed the likely cause being Hashimoto's.    We discussed thyroid symptoms as well as their nonspecificity.    We discussed variables going to thyroid hormone requirements.    We discussed goals for treatment.    Will aim for his TSH to be in the normal range.    We discussed the impact on other health conditions.    Will check TSH and a thyroperoxidase antibody today and make adjustments as warranted.    He will follow-up with me in 2 months sooner as needed.

## 2024-12-20 DIAGNOSIS — E03.9 ACQUIRED HYPOTHYROIDISM: Primary | ICD-10-CM

## 2024-12-20 PROCEDURE — RXMED WILLOW AMBULATORY MEDICATION CHARGE

## 2024-12-20 RX ORDER — LEVOTHYROXINE SODIUM 100 UG/1
100 TABLET ORAL DAILY
Qty: 30 TABLET | Refills: 11 | Status: SHIPPED | OUTPATIENT
Start: 2024-12-20 | End: 2025-12-20

## 2024-12-21 ENCOUNTER — PHARMACY VISIT (OUTPATIENT)
Dept: PHARMACY | Facility: CLINIC | Age: 59
End: 2024-12-21
Payer: COMMERCIAL

## 2024-12-21 PROCEDURE — RXMED WILLOW AMBULATORY MEDICATION CHARGE

## 2025-01-18 ENCOUNTER — PHARMACY VISIT (OUTPATIENT)
Dept: PHARMACY | Facility: CLINIC | Age: 60
End: 2025-01-18
Payer: COMMERCIAL

## 2025-01-18 PROCEDURE — RXMED WILLOW AMBULATORY MEDICATION CHARGE

## 2025-02-21 DIAGNOSIS — E78.00 HIGH CHOLESTEROL: ICD-10-CM

## 2025-02-21 PROCEDURE — RXMED WILLOW AMBULATORY MEDICATION CHARGE

## 2025-02-21 RX ORDER — ATORVASTATIN CALCIUM 40 MG/1
40 TABLET, FILM COATED ORAL NIGHTLY
Qty: 30 TABLET | Refills: 11 | Status: CANCELLED | OUTPATIENT
Start: 2025-02-21 | End: 2026-02-21

## 2025-02-22 ENCOUNTER — PHARMACY VISIT (OUTPATIENT)
Dept: PHARMACY | Facility: CLINIC | Age: 60
End: 2025-02-22
Payer: COMMERCIAL

## 2025-02-22 RX ORDER — ATORVASTATIN CALCIUM 40 MG/1
40 TABLET, FILM COATED ORAL NIGHTLY
Qty: 30 TABLET | Refills: 0 | Status: SHIPPED | OUTPATIENT
Start: 2025-02-22 | End: 2026-02-22

## 2025-02-24 PROCEDURE — RXMED WILLOW AMBULATORY MEDICATION CHARGE

## 2025-02-26 ENCOUNTER — PHARMACY VISIT (OUTPATIENT)
Dept: PHARMACY | Facility: CLINIC | Age: 60
End: 2025-02-26
Payer: COMMERCIAL

## 2025-03-20 ENCOUNTER — APPOINTMENT (OUTPATIENT)
Dept: PRIMARY CARE | Facility: CLINIC | Age: 60
End: 2025-03-20
Payer: COMMERCIAL

## 2025-03-24 DIAGNOSIS — E78.00 HIGH CHOLESTEROL: ICD-10-CM

## 2025-03-24 PROCEDURE — RXMED WILLOW AMBULATORY MEDICATION CHARGE

## 2025-03-25 ENCOUNTER — PHARMACY VISIT (OUTPATIENT)
Dept: PHARMACY | Facility: CLINIC | Age: 60
End: 2025-03-25
Payer: COMMERCIAL

## 2025-03-25 PROCEDURE — RXMED WILLOW AMBULATORY MEDICATION CHARGE

## 2025-03-25 RX ORDER — ATORVASTATIN CALCIUM 40 MG/1
40 TABLET, FILM COATED ORAL NIGHTLY
Qty: 30 TABLET | Refills: 0 | Status: SHIPPED | OUTPATIENT
Start: 2025-03-25 | End: 2026-03-25

## 2025-03-28 ENCOUNTER — PHARMACY VISIT (OUTPATIENT)
Dept: PHARMACY | Facility: CLINIC | Age: 60
End: 2025-03-28
Payer: COMMERCIAL

## 2025-04-15 ENCOUNTER — APPOINTMENT (OUTPATIENT)
Dept: ENDOCRINOLOGY | Facility: CLINIC | Age: 60
End: 2025-04-15
Payer: COMMERCIAL

## 2025-04-15 ENCOUNTER — TELEPHONE (OUTPATIENT)
Dept: PRIMARY CARE | Facility: CLINIC | Age: 60
End: 2025-04-15

## 2025-04-15 VITALS — WEIGHT: 171 LBS | SYSTOLIC BLOOD PRESSURE: 120 MMHG | BODY MASS INDEX: 23.19 KG/M2 | DIASTOLIC BLOOD PRESSURE: 78 MMHG

## 2025-04-15 DIAGNOSIS — E03.9 ACQUIRED HYPOTHYROIDISM: Primary | ICD-10-CM

## 2025-04-15 PROCEDURE — 3074F SYST BP LT 130 MM HG: CPT | Performed by: INTERNAL MEDICINE

## 2025-04-15 PROCEDURE — 3078F DIAST BP <80 MM HG: CPT | Performed by: INTERNAL MEDICINE

## 2025-04-15 PROCEDURE — 99214 OFFICE O/P EST MOD 30 MIN: CPT | Performed by: INTERNAL MEDICINE

## 2025-04-15 NOTE — PROGRESS NOTES
Patient ID: Sohan Valdez is a 60 y.o. male who presents for Follow-up.  HPI  The patient comes in for follow up.    He has Hashimoto's hypothyroidism hyperlipidemia hypertension.    At the last appointment we confirmed the diagnosis of Hashimoto's and increased his dose to 100 mcg/day.    He continues complain of fatigue dry skin cold intolerance and constipation.    He has had no other change in the medical regimen.    Physically he has no other complaints.    ROS  Comprehensive review of systems is negative.    Objective   Physical Exam  Visit Vitals  /78      Vitals:    04/15/25 1244   Weight: 77.6 kg (171 lb)      Body mass index is 23.19 kg/m².      Weight 171 down 3 pounds    Eyes normal  ENT normal. No adenopathy  Thyroid palpable and normal. No nodules  Chest clear to auscultation  Heart sounds are normal  Abdomen nontender. Bowel sounds normal. No organomegaly  Feet are okay    Current Outpatient Medications   Medication Sig Dispense Refill    atorvastatin (Lipitor) 40 mg tablet Take 1 tablet (40 mg) by mouth once daily at bedtime. 30 tablet 0    fluticasone (Flonase) 50 mcg/actuation nasal spray Administer 2 sprays into each nostril once daily.      levothyroxine (Synthroid, Levoxyl) 100 mcg tablet Take 1 tablet (100 mcg) by mouth early in the morning. Take on an empty stomach at the same time each day, either 30 to 60 minutes prior to breakfast 30 tablet 11     No current facility-administered medications for this visit.       Assessment/Plan     1.  Hashimoto's hypothyroidism  2.  Hypertension  3.  Hyperlipidemia    We discussed the diagnosis of Hashimoto's.    We discussed thyroid symptoms as well as their nonspecificity.    We discussed variables are going to thyroid hormone requirements.    Will check TSH and make adjustments if warranted.    He will follow-up with me in 6 months sooner as needed.

## 2025-04-16 DIAGNOSIS — E03.9 ACQUIRED HYPOTHYROIDISM: ICD-10-CM

## 2025-04-16 LAB — TSH SERPL-ACNC: 4.49 MIU/L (ref 0.4–4.5)

## 2025-04-16 RX ORDER — LEVOTHYROXINE SODIUM 112 UG/1
112 TABLET ORAL DAILY
Qty: 30 TABLET | Refills: 3 | Status: SHIPPED | OUTPATIENT
Start: 2025-04-16 | End: 2026-04-16

## 2025-04-23 ENCOUNTER — PHARMACY VISIT (OUTPATIENT)
Dept: PHARMACY | Facility: CLINIC | Age: 60
End: 2025-04-23
Payer: COMMERCIAL

## 2025-04-23 ENCOUNTER — APPOINTMENT (OUTPATIENT)
Dept: PRIMARY CARE | Facility: CLINIC | Age: 60
End: 2025-04-23

## 2025-04-23 PROCEDURE — RXMED WILLOW AMBULATORY MEDICATION CHARGE

## 2025-05-23 ENCOUNTER — PHARMACY VISIT (OUTPATIENT)
Dept: PHARMACY | Facility: CLINIC | Age: 60
End: 2025-05-23
Payer: COMMERCIAL

## 2025-05-23 PROCEDURE — RXMED WILLOW AMBULATORY MEDICATION CHARGE

## 2025-06-09 NOTE — PATIENT INSTRUCTIONS
It was nice seeing you today.     Please continue to take all medications as prescribed      Please call referral line to schedule follow up appointment with GI for elevated bilirubin      I recommend the updated COVID vaccine that can be obtained at your local pharmacy     Please schedule a follow up with me in 4-6 weeks for a physical   complains of pain/discomfort

## 2025-06-24 ENCOUNTER — PHARMACY VISIT (OUTPATIENT)
Dept: PHARMACY | Facility: CLINIC | Age: 60
End: 2025-06-24
Payer: COMMERCIAL

## 2025-06-24 PROCEDURE — RXMED WILLOW AMBULATORY MEDICATION CHARGE

## 2025-07-25 PROCEDURE — RXMED WILLOW AMBULATORY MEDICATION CHARGE

## 2025-07-26 ENCOUNTER — PHARMACY VISIT (OUTPATIENT)
Dept: PHARMACY | Facility: CLINIC | Age: 60
End: 2025-07-26
Payer: COMMERCIAL

## 2025-08-26 DIAGNOSIS — E03.9 ACQUIRED HYPOTHYROIDISM: ICD-10-CM

## 2025-08-27 ENCOUNTER — PHARMACY VISIT (OUTPATIENT)
Dept: PHARMACY | Facility: CLINIC | Age: 60
End: 2025-08-27
Payer: COMMERCIAL

## 2025-08-27 PROCEDURE — RXMED WILLOW AMBULATORY MEDICATION CHARGE

## 2025-08-27 RX ORDER — LEVOTHYROXINE SODIUM 112 UG/1
112 TABLET ORAL DAILY
Qty: 30 TABLET | Refills: 3 | Status: SHIPPED | OUTPATIENT
Start: 2025-08-27 | End: 2026-08-27

## 2025-10-15 ENCOUNTER — APPOINTMENT (OUTPATIENT)
Dept: ENDOCRINOLOGY | Facility: CLINIC | Age: 60
End: 2025-10-15
Payer: COMMERCIAL